# Patient Record
Sex: FEMALE | Race: WHITE | Employment: OTHER | URBAN - METROPOLITAN AREA
[De-identification: names, ages, dates, MRNs, and addresses within clinical notes are randomized per-mention and may not be internally consistent; named-entity substitution may affect disease eponyms.]

---

## 2017-01-16 ENCOUNTER — HOSPITAL ENCOUNTER (OUTPATIENT)
Dept: CV DIAGNOSTICS | Facility: HOSPITAL | Age: 72
Discharge: HOME | End: 2017-01-16
Attending: INTERNAL MEDICINE
Payer: MEDICARE

## 2017-01-16 DIAGNOSIS — R00.2 PALPITATIONS: ICD-10-CM

## 2017-01-16 PROCEDURE — 93226 XTRNL ECG REC<48 HR SCAN A/R: CPT

## 2017-01-16 PROCEDURE — 93227 XTRNL ECG REC<48 HR R&I: CPT | Performed by: INTERNAL MEDICINE

## 2017-01-16 PROCEDURE — 93225 XTRNL ECG REC<48 HRS REC: CPT

## 2017-01-19 ENCOUNTER — PRIOR ORIGINAL RECORDS (OUTPATIENT)
Dept: OTHER | Age: 72
End: 2017-01-19

## 2017-03-24 PROBLEM — G24.9 DYSTONIA: Status: ACTIVE | Noted: 2017-03-24

## 2017-04-03 ENCOUNTER — APPOINTMENT (OUTPATIENT)
Dept: LAB | Age: 72
End: 2017-04-03
Attending: PHYSICIAN ASSISTANT
Payer: MEDICARE

## 2017-04-03 DIAGNOSIS — B37.0 ORAL CANDIDA: ICD-10-CM

## 2017-04-03 DIAGNOSIS — K14.0 GLOSSITIS: ICD-10-CM

## 2017-04-03 PROCEDURE — 87070 CULTURE OTHR SPECIMN AEROBIC: CPT

## 2017-05-06 ENCOUNTER — HOSPITAL ENCOUNTER (OUTPATIENT)
Dept: ULTRASOUND IMAGING | Facility: HOSPITAL | Age: 72
Discharge: HOME | End: 2017-05-06
Attending: INTERNAL MEDICINE
Payer: MEDICARE

## 2017-05-06 DIAGNOSIS — R10.11 RUQ PAIN: ICD-10-CM

## 2017-05-06 PROCEDURE — 76700 US EXAM ABDOM COMPLETE: CPT | Performed by: INTERNAL MEDICINE

## 2017-05-23 ENCOUNTER — HOSPITAL (OUTPATIENT)
Dept: OTHER | Age: 72
End: 2017-05-23
Attending: INTERNAL MEDICINE

## 2017-06-14 ENCOUNTER — HOSPITAL (OUTPATIENT)
Dept: OTHER | Age: 72
End: 2017-06-14
Attending: INTERNAL MEDICINE

## 2017-06-14 ENCOUNTER — DIAGNOSTIC TRANS (OUTPATIENT)
Dept: OTHER | Age: 72
End: 2017-06-14

## 2017-06-16 ENCOUNTER — PRIOR ORIGINAL RECORDS (OUTPATIENT)
Dept: OTHER | Age: 72
End: 2017-06-16

## 2017-06-19 ENCOUNTER — PRIOR ORIGINAL RECORDS (OUTPATIENT)
Dept: OTHER | Age: 72
End: 2017-06-19

## 2017-06-27 ENCOUNTER — CHARTING TRANS (OUTPATIENT)
Dept: OTHER | Age: 72
End: 2017-06-27

## 2017-06-27 ENCOUNTER — HOSPITAL (OUTPATIENT)
Dept: OTHER | Age: 72
End: 2017-06-27
Attending: INTERNAL MEDICINE

## 2017-08-18 PROBLEM — R25.1 TREMOR: Status: ACTIVE | Noted: 2017-08-18

## 2017-08-18 PROCEDURE — 82746 ASSAY OF FOLIC ACID SERUM: CPT | Performed by: INTERNAL MEDICINE

## 2017-08-18 PROCEDURE — 82607 VITAMIN B-12: CPT | Performed by: INTERNAL MEDICINE

## 2017-10-26 ENCOUNTER — PRIOR ORIGINAL RECORDS (OUTPATIENT)
Dept: OTHER | Age: 72
End: 2017-10-26

## 2017-12-12 ENCOUNTER — PRIOR ORIGINAL RECORDS (OUTPATIENT)
Dept: OTHER | Age: 72
End: 2017-12-12

## 2018-01-02 ENCOUNTER — HOSPITAL ENCOUNTER (OUTPATIENT)
Dept: CV DIAGNOSTICS | Facility: HOSPITAL | Age: 73
Discharge: HOME | End: 2018-01-02
Attending: INTERNAL MEDICINE
Payer: MEDICARE

## 2018-01-02 DIAGNOSIS — R00.2 PALPITATION: ICD-10-CM

## 2018-01-02 PROCEDURE — 93226 XTRNL ECG REC<48 HR SCAN A/R: CPT | Performed by: INTERNAL MEDICINE

## 2018-01-02 PROCEDURE — 93225 XTRNL ECG REC<48 HRS REC: CPT | Performed by: INTERNAL MEDICINE

## 2018-01-02 PROCEDURE — 93227 XTRNL ECG REC<48 HR R&I: CPT | Performed by: INTERNAL MEDICINE

## 2018-01-22 LAB
ALBUMIN: 3.8 G/DL
ALKALINE PHOSPHATATE(ALK PHOS): 52 IU/L
BILIRUBIN TOTAL: 0.47 MG/DL
BUN: 11 MG/DL
CALCIUM: 9.2 MG/DL
CHLORIDE: 99 MEQ/L
CHOLESTEROL, TOTAL: 223 MG/DL
CREATININE, SERUM: 0.86 MG/DL
FREE T4: 1.26 MG/DL
GLUCOSE: 94 MG/DL
HDL CHOLESTEROL: 116 MG/DL
HEMATOCRIT: 40.7 %
HEMOGLOBIN A1C: 5.4 %
HEMOGLOBIN: 13 G/DL
LDL CHOLESTEROL: 93 MG/DL
PLATELETS: 174 K/UL
POTASSIUM, SERUM: 4.1 MEQ/L
PROTEIN, TOTAL: 7 G/DL
RED BLOOD COUNT: 4.37 X 10-6/U
SGOT (AST): 25 IU/L
SGPT (ALT): 32 IU/L
SODIUM: 136 MEQ/L
THYROID STIMULATING HORMONE: 1.51 MLU/L
TRIGLYCERIDES: 71 MG/DL
WHITE BLOOD COUNT: 3.68 X 10-3/U

## 2018-01-23 ENCOUNTER — PRIOR ORIGINAL RECORDS (OUTPATIENT)
Dept: OTHER | Age: 73
End: 2018-01-23

## 2018-04-02 ENCOUNTER — PRIOR ORIGINAL RECORDS (OUTPATIENT)
Dept: OTHER | Age: 73
End: 2018-04-02

## 2018-04-03 ENCOUNTER — PRIOR ORIGINAL RECORDS (OUTPATIENT)
Dept: OTHER | Age: 73
End: 2018-04-03

## 2018-04-12 ENCOUNTER — MYAURORA ACCOUNT LINK (OUTPATIENT)
Dept: OTHER | Age: 73
End: 2018-04-12

## 2018-04-12 ENCOUNTER — HOSPITAL ENCOUNTER (OUTPATIENT)
Dept: ULTRASOUND IMAGING | Facility: HOSPITAL | Age: 73
Discharge: HOME | End: 2018-04-12
Attending: INTERNAL MEDICINE
Payer: MEDICARE

## 2018-04-12 ENCOUNTER — HOSPITAL ENCOUNTER (OUTPATIENT)
Dept: CARDIOLOGY CLINIC | Facility: HOSPITAL | Age: 73
Discharge: HOME | End: 2018-04-12
Attending: INTERNAL MEDICINE

## 2018-04-12 ENCOUNTER — HOSPITAL ENCOUNTER (OUTPATIENT)
Dept: CV DIAGNOSTICS | Facility: HOSPITAL | Age: 73
Discharge: HOME | End: 2018-04-12
Attending: INTERNAL MEDICINE

## 2018-04-12 DIAGNOSIS — R10.9 ABDOMINAL PAIN: ICD-10-CM

## 2018-04-12 DIAGNOSIS — R94.30 ABNORMAL RESULTS OF CARDIOVASCULAR FUNCTION STUDIES: ICD-10-CM

## 2018-04-12 DIAGNOSIS — R00.2 PALPITATIONS: ICD-10-CM

## 2018-04-12 DIAGNOSIS — R94.30 ABNORMAL RESULT OF CARDIOVASCULAR FUNCTION STUDY: ICD-10-CM

## 2018-04-12 PROCEDURE — 76700 US EXAM ABDOM COMPLETE: CPT | Performed by: INTERNAL MEDICINE

## 2018-04-12 NOTE — IMAGING NOTE
Pt to have CTA gated coronary study on 4/17 @ 1000. She called in to discuss procedure and ask some questions. I provided explanation of the procedure, the medications uses, beta blocker and nitroglycerine and reasons why.  Pt very apprehensive about taking

## 2018-04-13 ENCOUNTER — LAB ENCOUNTER (OUTPATIENT)
Dept: LAB | Age: 73
End: 2018-04-13
Attending: INTERNAL MEDICINE
Payer: MEDICARE

## 2018-04-13 DIAGNOSIS — R94.30 NONSPECIFIC ABNORMAL FUNCTION STUDY, CARDIOVASCULAR: Primary | ICD-10-CM

## 2018-04-13 PROCEDURE — 82565 ASSAY OF CREATININE: CPT

## 2018-04-13 PROCEDURE — 84520 ASSAY OF UREA NITROGEN: CPT

## 2018-04-17 ENCOUNTER — HOSPITAL ENCOUNTER (OUTPATIENT)
Dept: CT IMAGING | Facility: HOSPITAL | Age: 73
Discharge: HOME | End: 2018-04-17
Attending: INTERNAL MEDICINE
Payer: MEDICARE

## 2018-04-17 ENCOUNTER — PRIOR ORIGINAL RECORDS (OUTPATIENT)
Dept: OTHER | Age: 73
End: 2018-04-17

## 2018-07-24 PROBLEM — E87.1 HYPONATREMIA: Status: ACTIVE | Noted: 2018-07-24

## 2018-07-24 PROBLEM — L65.9 HAIR LOSS: Status: ACTIVE | Noted: 2018-07-24

## 2018-07-24 PROBLEM — F10.10 ALCOHOL ABUSE: Status: ACTIVE | Noted: 2018-07-24

## 2018-07-24 PROBLEM — R20.2 PARESTHESIA: Status: ACTIVE | Noted: 2018-07-24

## 2018-07-30 ENCOUNTER — PRIOR ORIGINAL RECORDS (OUTPATIENT)
Dept: OTHER | Age: 73
End: 2018-07-30

## 2018-07-30 ENCOUNTER — MYAURORA ACCOUNT LINK (OUTPATIENT)
Dept: OTHER | Age: 73
End: 2018-07-30

## 2018-07-30 PROBLEM — E87.1 HYPONATREMIA: Status: RESOLVED | Noted: 2018-07-24 | Resolved: 2018-07-30

## 2018-08-01 ENCOUNTER — PRIOR ORIGINAL RECORDS (OUTPATIENT)
Dept: OTHER | Age: 73
End: 2018-08-01

## 2018-08-24 ENCOUNTER — PRIOR ORIGINAL RECORDS (OUTPATIENT)
Dept: OTHER | Age: 73
End: 2018-08-24

## 2018-08-28 ENCOUNTER — PRIOR ORIGINAL RECORDS (OUTPATIENT)
Dept: OTHER | Age: 73
End: 2018-08-28

## 2018-08-30 ENCOUNTER — PRIOR ORIGINAL RECORDS (OUTPATIENT)
Dept: OTHER | Age: 73
End: 2018-08-30

## 2018-09-25 ENCOUNTER — PRIOR ORIGINAL RECORDS (OUTPATIENT)
Dept: OTHER | Age: 73
End: 2018-09-25

## 2018-10-05 PROCEDURE — 87493 C DIFF AMPLIFIED PROBE: CPT | Performed by: INTERNAL MEDICINE

## 2018-10-06 PROCEDURE — 87077 CULTURE AEROBIC IDENTIFY: CPT | Performed by: INTERNAL MEDICINE

## 2018-10-06 PROCEDURE — 82272 OCCULT BLD FECES 1-3 TESTS: CPT | Performed by: INTERNAL MEDICINE

## 2018-10-06 PROCEDURE — 87045 FECES CULTURE AEROBIC BACT: CPT | Performed by: INTERNAL MEDICINE

## 2018-10-06 PROCEDURE — 87046 STOOL CULTR AEROBIC BACT EA: CPT | Performed by: INTERNAL MEDICINE

## 2018-10-06 PROCEDURE — 87427 SHIGA-LIKE TOXIN AG IA: CPT | Performed by: INTERNAL MEDICINE

## 2018-10-16 PROCEDURE — 87046 STOOL CULTR AEROBIC BACT EA: CPT | Performed by: INTERNAL MEDICINE

## 2018-10-16 PROCEDURE — 87045 FECES CULTURE AEROBIC BACT: CPT | Performed by: INTERNAL MEDICINE

## 2018-10-16 PROCEDURE — 87427 SHIGA-LIKE TOXIN AG IA: CPT | Performed by: INTERNAL MEDICINE

## 2018-10-19 ENCOUNTER — HOSPITAL ENCOUNTER (OUTPATIENT)
Dept: GENERAL RADIOLOGY | Age: 73
Discharge: HOME | End: 2018-10-19
Attending: INTERNAL MEDICINE
Payer: MEDICARE

## 2018-10-19 DIAGNOSIS — R68.83 CHILLS: ICD-10-CM

## 2018-10-19 DIAGNOSIS — R06.2 WHEEZING: ICD-10-CM

## 2018-10-19 PROCEDURE — 87040 BLOOD CULTURE FOR BACTERIA: CPT | Performed by: INTERNAL MEDICINE

## 2018-10-19 PROCEDURE — 71046 X-RAY EXAM CHEST 2 VIEWS: CPT | Performed by: INTERNAL MEDICINE

## 2018-10-29 PROBLEM — R68.83 CHILLS: Status: ACTIVE | Noted: 2018-10-29

## 2018-10-29 PROBLEM — G89.29 CHRONIC MIDLINE LOW BACK PAIN WITHOUT SCIATICA: Status: ACTIVE | Noted: 2018-10-29

## 2018-10-29 PROBLEM — M54.50 CHRONIC MIDLINE LOW BACK PAIN WITHOUT SCIATICA: Status: ACTIVE | Noted: 2018-10-29

## 2018-10-29 PROBLEM — R10.30 LOWER ABDOMINAL PAIN: Status: ACTIVE | Noted: 2018-10-29

## 2018-10-30 ENCOUNTER — HOSPITAL ENCOUNTER (OUTPATIENT)
Dept: GENERAL RADIOLOGY | Age: 73
Discharge: HOME | End: 2018-10-30
Attending: INTERNAL MEDICINE
Payer: MEDICARE

## 2018-10-30 DIAGNOSIS — R10.30 LOWER ABDOMINAL PAIN: ICD-10-CM

## 2018-10-30 PROCEDURE — 74018 RADEX ABDOMEN 1 VIEW: CPT | Performed by: INTERNAL MEDICINE

## 2018-12-10 PROCEDURE — 83516 IMMUNOASSAY NONANTIBODY: CPT | Performed by: INTERNAL MEDICINE

## 2018-12-12 PROCEDURE — 87338 HPYLORI STOOL AG IA: CPT | Performed by: INTERNAL MEDICINE

## 2018-12-20 ENCOUNTER — PRIOR ORIGINAL RECORDS (OUTPATIENT)
Dept: OTHER | Age: 73
End: 2018-12-20

## 2018-12-27 NOTE — IMAGING NOTE
Spoke to patient over phone and she complained about her vertigo which she has been getting more frequently. She said she will see her PCP Dec 31 at 1000 to hopefully get it resolved.  She said she will postpone  CTA appointment instead and move it on anot

## 2018-12-31 ENCOUNTER — HOSPITAL ENCOUNTER (OUTPATIENT)
Dept: CT IMAGING | Facility: HOSPITAL | Age: 73
Discharge: HOME | End: 2018-12-31
Attending: INTERNAL MEDICINE
Payer: MEDICARE

## 2019-01-15 ENCOUNTER — PRIOR ORIGINAL RECORDS (OUTPATIENT)
Dept: OTHER | Age: 74
End: 2019-01-15

## 2019-01-17 ENCOUNTER — PRIOR ORIGINAL RECORDS (OUTPATIENT)
Dept: OTHER | Age: 74
End: 2019-01-17

## 2019-01-22 ENCOUNTER — PRIOR ORIGINAL RECORDS (OUTPATIENT)
Dept: OTHER | Age: 74
End: 2019-01-22

## 2019-02-28 VITALS
WEIGHT: 115 LBS | DIASTOLIC BLOOD PRESSURE: 78 MMHG | HEIGHT: 62 IN | SYSTOLIC BLOOD PRESSURE: 130 MMHG | HEART RATE: 71 BPM | BODY MASS INDEX: 21.16 KG/M2

## 2019-02-28 VITALS — WEIGHT: 117 LBS | HEART RATE: 72 BPM | SYSTOLIC BLOOD PRESSURE: 120 MMHG | DIASTOLIC BLOOD PRESSURE: 70 MMHG

## 2019-03-07 ENCOUNTER — TELEPHONE (OUTPATIENT)
Dept: CARDIOLOGY | Age: 74
End: 2019-03-07

## 2019-04-08 ENCOUNTER — TELEPHONE (OUTPATIENT)
Dept: CARDIOLOGY | Age: 74
End: 2019-04-08

## 2019-04-12 ENCOUNTER — TELEPHONE (OUTPATIENT)
Dept: CARDIOLOGY | Age: 74
End: 2019-04-12

## 2019-04-27 RX ORDER — RANITIDINE 150 MG/1
TABLET ORAL
COMMUNITY
End: 2020-06-29

## 2019-04-27 RX ORDER — PANTOPRAZOLE SODIUM 40 MG/1
TABLET, DELAYED RELEASE ORAL
COMMUNITY
End: 2020-06-25

## 2019-04-27 RX ORDER — SUCRALFATE 1 G/1
TABLET ORAL
COMMUNITY
End: 2020-06-25

## 2019-04-27 RX ORDER — METOPROLOL TARTRATE 50 MG/1
TABLET, FILM COATED ORAL
COMMUNITY
End: 2020-06-25

## 2019-04-27 RX ORDER — GABAPENTIN 100 MG/1
CAPSULE ORAL
COMMUNITY
End: 2020-06-25 | Stop reason: DRUGHIGH

## 2019-04-27 RX ORDER — DIAZEPAM 2 MG/1
TABLET ORAL
COMMUNITY

## 2019-05-01 PROBLEM — R53.83 FATIGUE, UNSPECIFIED TYPE: Status: ACTIVE | Noted: 2019-05-01

## 2019-05-01 PROBLEM — R41.3 MEMORY LOSS: Status: ACTIVE | Noted: 2019-05-01

## 2019-05-01 PROBLEM — M54.50 CHRONIC BILATERAL LOW BACK PAIN WITHOUT SCIATICA: Status: ACTIVE | Noted: 2018-10-29

## 2019-05-01 PROBLEM — G89.29 CHRONIC BILATERAL LOW BACK PAIN WITHOUT SCIATICA: Status: ACTIVE | Noted: 2018-10-29

## 2019-05-01 PROBLEM — K59.00 CONSTIPATION, UNSPECIFIED CONSTIPATION TYPE: Status: ACTIVE | Noted: 2019-05-01

## 2019-05-03 PROCEDURE — 83921 ORGANIC ACID SINGLE QUANT: CPT | Performed by: INTERNAL MEDICINE

## 2019-05-03 PROCEDURE — 84425 ASSAY OF VITAMIN B-1: CPT | Performed by: INTERNAL MEDICINE

## 2019-05-06 ENCOUNTER — OFFICE VISIT (OUTPATIENT)
Dept: CARDIOLOGY | Age: 74
End: 2019-05-06

## 2019-05-06 VITALS
HEART RATE: 68 BPM | WEIGHT: 117 LBS | BODY MASS INDEX: 21.53 KG/M2 | SYSTOLIC BLOOD PRESSURE: 120 MMHG | DIASTOLIC BLOOD PRESSURE: 76 MMHG | HEIGHT: 62 IN

## 2019-05-06 DIAGNOSIS — I25.10 CORONARY ARTERY DISEASE INVOLVING NATIVE CORONARY ARTERY OF NATIVE HEART WITHOUT ANGINA PECTORIS: Primary | ICD-10-CM

## 2019-05-06 DIAGNOSIS — R93.1 ELEVATED CORONARY ARTERY CALCIUM SCORE: ICD-10-CM

## 2019-05-06 DIAGNOSIS — E78.00 PURE HYPERCHOLESTEROLEMIA: ICD-10-CM

## 2019-05-06 DIAGNOSIS — M48.02 CERVICAL STENOSIS OF SPINAL CANAL: ICD-10-CM

## 2019-05-06 DIAGNOSIS — R00.2 PALPITATIONS: ICD-10-CM

## 2019-05-06 PROCEDURE — 99215 OFFICE O/P EST HI 40 MIN: CPT | Performed by: INTERNAL MEDICINE

## 2019-05-06 ASSESSMENT — ENCOUNTER SYMPTOMS
SUSPICIOUS LESIONS: 0
CHILLS: 0
HEMOPTYSIS: 0
HEMATOCHEZIA: 0
BRUISES/BLEEDS EASILY: 0
FEVER: 0
ALLERGIC/IMMUNOLOGIC COMMENTS: NO NEW FOOD ALLERGIES
COUGH: 0
WEIGHT GAIN: 0
WEIGHT LOSS: 0

## 2019-11-21 PROBLEM — R10.30 LOWER ABDOMINAL PAIN: Status: RESOLVED | Noted: 2018-10-29 | Resolved: 2019-11-21

## 2019-11-21 PROBLEM — R68.83 CHILLS: Status: RESOLVED | Noted: 2018-10-29 | Resolved: 2019-11-21

## 2019-11-21 PROBLEM — R63.5 WEIGHT GAIN: Status: ACTIVE | Noted: 2019-11-21

## 2019-11-21 PROBLEM — K59.09 CHRONIC CONSTIPATION: Status: ACTIVE | Noted: 2019-05-01

## 2019-11-21 PROBLEM — M62.89 HIGH-TONE PELVIC FLOOR DYSFUNCTION: Status: ACTIVE | Noted: 2019-08-16

## 2019-11-21 PROBLEM — N94.89 HIGH-TONE PELVIC FLOOR DYSFUNCTION: Status: ACTIVE | Noted: 2019-08-16

## 2019-11-21 PROBLEM — E72.11 HYPERHOMOCYSTEINEMIA (HCC): Status: ACTIVE | Noted: 2019-11-21

## 2019-11-21 PROBLEM — R53.83 FATIGUE, UNSPECIFIED TYPE: Status: RESOLVED | Noted: 2019-05-01 | Resolved: 2019-11-21

## 2019-11-21 PROBLEM — M48.02 CERVICAL STENOSIS OF SPINAL CANAL: Status: ACTIVE | Noted: 2019-05-06

## 2019-11-21 PROBLEM — R93.1 ELEVATED CORONARY ARTERY CALCIUM SCORE: Status: ACTIVE | Noted: 2019-11-21

## 2019-12-10 PROBLEM — F07.81 POST CONCUSSIVE SYNDROME: Status: ACTIVE | Noted: 2019-12-10

## 2020-01-01 ENCOUNTER — EXTERNAL RECORD (OUTPATIENT)
Dept: OTHER | Age: 75
End: 2020-01-01

## 2020-01-16 PROBLEM — E87.1 HYPONATREMIA: Status: RESOLVED | Noted: 2018-07-24 | Resolved: 2020-01-16

## 2020-04-23 ENCOUNTER — TELEPHONE (OUTPATIENT)
Dept: CARDIOLOGY | Age: 75
End: 2020-04-23

## 2020-05-13 ENCOUNTER — TELEPHONE (OUTPATIENT)
Dept: CARDIOLOGY | Age: 75
End: 2020-05-13

## 2020-06-17 ENCOUNTER — TELEPHONE (OUTPATIENT)
Dept: CARDIOLOGY | Age: 75
End: 2020-06-17

## 2020-06-23 ENCOUNTER — ORDER TRANSCRIPTION (OUTPATIENT)
Dept: ADMINISTRATIVE | Facility: HOSPITAL | Age: 75
End: 2020-06-23

## 2020-06-23 ENCOUNTER — TELEPHONE (OUTPATIENT)
Dept: CARDIOLOGY | Age: 75
End: 2020-06-23

## 2020-06-23 DIAGNOSIS — E78.00 PURE HYPERCHOLESTEROLEMIA: ICD-10-CM

## 2020-06-23 DIAGNOSIS — R93.1 ELEVATED CORONARY ARTERY CALCIUM SCORE: ICD-10-CM

## 2020-06-23 DIAGNOSIS — R00.2 PALPITATIONS: ICD-10-CM

## 2020-06-23 DIAGNOSIS — I25.10 CORONARY ATHEROSCLEROSIS OF NATIVE CORONARY ARTERY: ICD-10-CM

## 2020-06-23 DIAGNOSIS — E78.00 PURE HYPERCHOLESTEROLEMIA: Primary | ICD-10-CM

## 2020-06-23 DIAGNOSIS — I25.10 CORONARY ARTERY DISEASE INVOLVING NATIVE CORONARY ARTERY OF NATIVE HEART WITHOUT ANGINA PECTORIS: Primary | ICD-10-CM

## 2020-06-25 ENCOUNTER — OFFICE VISIT (OUTPATIENT)
Dept: CARDIOLOGY | Age: 75
End: 2020-06-25

## 2020-06-25 VITALS
DIASTOLIC BLOOD PRESSURE: 68 MMHG | HEART RATE: 80 BPM | BODY MASS INDEX: 22.26 KG/M2 | SYSTOLIC BLOOD PRESSURE: 114 MMHG | WEIGHT: 121 LBS | HEIGHT: 62 IN

## 2020-06-25 DIAGNOSIS — R93.1 ELEVATED CORONARY ARTERY CALCIUM SCORE: ICD-10-CM

## 2020-06-25 DIAGNOSIS — I25.10 CORONARY ARTERY DISEASE INVOLVING NATIVE CORONARY ARTERY OF NATIVE HEART WITHOUT ANGINA PECTORIS: Primary | ICD-10-CM

## 2020-06-25 DIAGNOSIS — E78.00 PURE HYPERCHOLESTEROLEMIA: ICD-10-CM

## 2020-06-25 DIAGNOSIS — M79.7 FIBROMYALGIA: ICD-10-CM

## 2020-06-25 PROCEDURE — 99213 OFFICE O/P EST LOW 20 MIN: CPT | Performed by: NURSE PRACTITIONER

## 2020-06-25 RX ORDER — MELATONIN: COMMUNITY

## 2020-06-25 RX ORDER — GABAPENTIN 100 MG/1
100 CAPSULE ORAL 2 TIMES DAILY
COMMUNITY

## 2020-06-25 RX ORDER — RABEPRAZOLE SODIUM 20 MG/1
20 TABLET, DELAYED RELEASE ORAL 2 TIMES DAILY
COMMUNITY
End: 2020-06-29

## 2020-06-25 ASSESSMENT — PATIENT HEALTH QUESTIONNAIRE - PHQ9
SUM OF ALL RESPONSES TO PHQ9 QUESTIONS 1 AND 2: 0
CLINICAL INTERPRETATION OF PHQ9 SCORE: NO FURTHER SCREENING NEEDED
SUM OF ALL RESPONSES TO PHQ9 QUESTIONS 1 AND 2: 0
CLINICAL INTERPRETATION OF PHQ2 SCORE: NO FURTHER SCREENING NEEDED
1. LITTLE INTEREST OR PLEASURE IN DOING THINGS: NOT AT ALL
2. FEELING DOWN, DEPRESSED OR HOPELESS: NOT AT ALL

## 2020-06-25 ASSESSMENT — ENCOUNTER SYMPTOMS
WEIGHT GAIN: 0
GASTROINTESTINAL NEGATIVE: 1
SYNCOPE: 0
DECREASED APPETITE: 0
NEUROLOGICAL NEGATIVE: 1
DIAPHORESIS: 0
WEIGHT LOSS: 0
SHORTNESS OF BREATH: 0

## 2020-06-29 ENCOUNTER — TELEPHONE (OUTPATIENT)
Dept: CARDIOLOGY | Age: 75
End: 2020-06-29

## 2020-06-29 ENCOUNTER — OFFICE VISIT (OUTPATIENT)
Dept: CARDIOLOGY | Age: 75
End: 2020-06-29

## 2020-06-29 VITALS — BODY MASS INDEX: 21.53 KG/M2 | WEIGHT: 117 LBS | HEIGHT: 62 IN

## 2020-06-29 DIAGNOSIS — E78.00 PURE HYPERCHOLESTEROLEMIA: ICD-10-CM

## 2020-06-29 DIAGNOSIS — I25.10 CORONARY ARTERY DISEASE INVOLVING NATIVE CORONARY ARTERY OF NATIVE HEART WITHOUT ANGINA PECTORIS: ICD-10-CM

## 2020-06-29 DIAGNOSIS — R93.1 ELEVATED CORONARY ARTERY CALCIUM SCORE: ICD-10-CM

## 2020-06-29 DIAGNOSIS — G25.0 ESSENTIAL TREMOR: Primary | ICD-10-CM

## 2020-06-29 PROBLEM — F43.10 POST TRAUMATIC STRESS DISORDER (PTSD): Status: ACTIVE | Noted: 2020-06-29

## 2020-06-29 RX ORDER — PANTOPRAZOLE SODIUM 40 MG/1
40 TABLET, DELAYED RELEASE ORAL DAILY
COMMUNITY

## 2020-06-29 SDOH — HEALTH STABILITY: MENTAL HEALTH: HOW MANY STANDARD DRINKS CONTAINING ALCOHOL DO YOU HAVE ON A TYPICAL DAY?: 1 OR 2

## 2020-06-29 SDOH — HEALTH STABILITY: PHYSICAL HEALTH: ON AVERAGE, HOW MANY MINUTES DO YOU ENGAGE IN EXERCISE AT THIS LEVEL?: 150+ MIN

## 2020-06-29 SDOH — HEALTH STABILITY: MENTAL HEALTH: HOW OFTEN DO YOU HAVE A DRINK CONTAINING ALCOHOL?: 4 OR MORE TIMES A WEEK

## 2020-06-29 SDOH — HEALTH STABILITY: PHYSICAL HEALTH: ON AVERAGE, HOW MANY DAYS PER WEEK DO YOU ENGAGE IN MODERATE TO STRENUOUS EXERCISE (LIKE A BRISK WALK)?: 5 DAYS

## 2020-06-29 ASSESSMENT — ENCOUNTER SYMPTOMS
ALLERGIC/IMMUNOLOGIC COMMENTS: NO NEW FOOD ALLERGIES
SUSPICIOUS LESIONS: 0
HEMOPTYSIS: 0
WEIGHT LOSS: 0
WEIGHT GAIN: 0
FEVER: 0
BRUISES/BLEEDS EASILY: 0
CHILLS: 0
HEMATOCHEZIA: 0
COUGH: 0

## 2020-06-30 ENCOUNTER — OFFICE VISIT (OUTPATIENT)
Dept: CARDIOLOGY | Age: 75
End: 2020-06-30

## 2020-06-30 VITALS
WEIGHT: 120 LBS | HEART RATE: 74 BPM | DIASTOLIC BLOOD PRESSURE: 70 MMHG | BODY MASS INDEX: 21.95 KG/M2 | SYSTOLIC BLOOD PRESSURE: 122 MMHG

## 2020-06-30 DIAGNOSIS — M48.02 CERVICAL STENOSIS OF SPINAL CANAL: ICD-10-CM

## 2020-06-30 DIAGNOSIS — R93.1 ELEVATED CORONARY ARTERY CALCIUM SCORE: Primary | ICD-10-CM

## 2020-06-30 DIAGNOSIS — R07.9 CHEST PAIN, UNSPECIFIED TYPE: ICD-10-CM

## 2020-06-30 DIAGNOSIS — I25.10 CORONARY ARTERY DISEASE INVOLVING NATIVE CORONARY ARTERY OF NATIVE HEART WITHOUT ANGINA PECTORIS: ICD-10-CM

## 2020-06-30 DIAGNOSIS — R06.09 DYSPNEA ON EXERTION: ICD-10-CM

## 2020-06-30 DIAGNOSIS — G25.0 ESSENTIAL TREMOR: ICD-10-CM

## 2020-06-30 PROCEDURE — 99215 OFFICE O/P EST HI 40 MIN: CPT | Performed by: INTERNAL MEDICINE

## 2020-06-30 SDOH — HEALTH STABILITY: MENTAL HEALTH: HOW OFTEN DO YOU HAVE A DRINK CONTAINING ALCOHOL?: 4 OR MORE TIMES A WEEK

## 2020-06-30 SDOH — HEALTH STABILITY: MENTAL HEALTH: HOW MANY STANDARD DRINKS CONTAINING ALCOHOL DO YOU HAVE ON A TYPICAL DAY?: 1 OR 2

## 2020-06-30 ASSESSMENT — PATIENT HEALTH QUESTIONNAIRE - PHQ9
CLINICAL INTERPRETATION OF PHQ9 SCORE: NO FURTHER SCREENING NEEDED
CLINICAL INTERPRETATION OF PHQ2 SCORE: NO FURTHER SCREENING NEEDED
2. FEELING DOWN, DEPRESSED OR HOPELESS: NOT AT ALL
SUM OF ALL RESPONSES TO PHQ9 QUESTIONS 1 AND 2: 0
SUM OF ALL RESPONSES TO PHQ9 QUESTIONS 1 AND 2: 0
1. LITTLE INTEREST OR PLEASURE IN DOING THINGS: NOT AT ALL

## 2020-06-30 ASSESSMENT — ENCOUNTER SYMPTOMS: SHORTNESS OF BREATH: 1

## 2020-08-29 PROBLEM — F43.10 POST TRAUMATIC STRESS DISORDER (PTSD): Status: ACTIVE | Noted: 2020-06-29

## 2020-08-29 PROBLEM — N30.90 CYSTITIS: Status: ACTIVE | Noted: 2020-08-29

## 2020-10-12 ENCOUNTER — TELEPHONE (OUTPATIENT)
Dept: CARDIOLOGY | Age: 75
End: 2020-10-12

## 2020-10-13 ENCOUNTER — TELEPHONE (OUTPATIENT)
Dept: CARDIOLOGY | Age: 75
End: 2020-10-13

## 2020-10-20 ENCOUNTER — OFFICE VISIT (OUTPATIENT)
Dept: CARDIOLOGY | Age: 75
End: 2020-10-20

## 2020-10-20 VITALS
DIASTOLIC BLOOD PRESSURE: 60 MMHG | WEIGHT: 120 LBS | HEART RATE: 70 BPM | BODY MASS INDEX: 21.95 KG/M2 | SYSTOLIC BLOOD PRESSURE: 120 MMHG

## 2020-10-20 DIAGNOSIS — I25.10 CORONARY ARTERY DISEASE INVOLVING NATIVE CORONARY ARTERY OF NATIVE HEART WITHOUT ANGINA PECTORIS: ICD-10-CM

## 2020-10-20 DIAGNOSIS — E78.00 PURE HYPERCHOLESTEROLEMIA: Primary | ICD-10-CM

## 2020-10-20 DIAGNOSIS — R93.1 ELEVATED CORONARY ARTERY CALCIUM SCORE: ICD-10-CM

## 2020-10-20 DIAGNOSIS — G25.0 ESSENTIAL TREMOR: ICD-10-CM

## 2020-10-20 PROCEDURE — 99213 OFFICE O/P EST LOW 20 MIN: CPT | Performed by: INTERNAL MEDICINE

## 2020-10-20 SDOH — HEALTH STABILITY: MENTAL HEALTH: HOW OFTEN DO YOU HAVE A DRINK CONTAINING ALCOHOL?: 4 OR MORE TIMES A WEEK

## 2020-10-20 SDOH — HEALTH STABILITY: MENTAL HEALTH: HOW MANY STANDARD DRINKS CONTAINING ALCOHOL DO YOU HAVE ON A TYPICAL DAY?: 1 OR 2

## 2020-10-20 ASSESSMENT — PATIENT HEALTH QUESTIONNAIRE - PHQ9
1. LITTLE INTEREST OR PLEASURE IN DOING THINGS: NOT AT ALL
SUM OF ALL RESPONSES TO PHQ9 QUESTIONS 1 AND 2: 0
SUM OF ALL RESPONSES TO PHQ9 QUESTIONS 1 AND 2: 0
2. FEELING DOWN, DEPRESSED OR HOPELESS: NOT AT ALL
CLINICAL INTERPRETATION OF PHQ9 SCORE: NO FURTHER SCREENING NEEDED
CLINICAL INTERPRETATION OF PHQ2 SCORE: NO FURTHER SCREENING NEEDED

## 2020-10-20 ASSESSMENT — ENCOUNTER SYMPTOMS
WEIGHT LOSS: 0
WEIGHT GAIN: 0
SUSPICIOUS LESIONS: 0
COUGH: 0
FEVER: 0
HEMATOCHEZIA: 0
HEMOPTYSIS: 0
BRUISES/BLEEDS EASILY: 0
ALLERGIC/IMMUNOLOGIC COMMENTS: NO NEW FOOD ALLERGIES
CHILLS: 0

## 2020-10-21 ENCOUNTER — TELEPHONE (OUTPATIENT)
Dept: CARDIOLOGY | Age: 75
End: 2020-10-21

## 2020-10-21 DIAGNOSIS — R93.1 ELEVATED CORONARY ARTERY CALCIUM SCORE: Primary | ICD-10-CM

## 2020-10-21 DIAGNOSIS — E78.00 PURE HYPERCHOLESTEROLEMIA: ICD-10-CM

## 2020-10-21 DIAGNOSIS — I25.10 CORONARY ARTERY DISEASE INVOLVING NATIVE CORONARY ARTERY OF NATIVE HEART WITHOUT ANGINA PECTORIS: ICD-10-CM

## 2020-10-26 ENCOUNTER — TELEPHONE (OUTPATIENT)
Dept: CARDIOLOGY | Age: 75
End: 2020-10-26

## 2020-10-27 ENCOUNTER — TELEPHONE (OUTPATIENT)
Dept: CARDIOLOGY | Age: 75
End: 2020-10-27

## 2020-10-27 DIAGNOSIS — E78.00 PURE HYPERCHOLESTEROLEMIA: ICD-10-CM

## 2020-10-27 DIAGNOSIS — R93.1 ELEVATED CORONARY ARTERY CALCIUM SCORE: Primary | ICD-10-CM

## 2020-10-27 DIAGNOSIS — I25.10 CORONARY ARTERY DISEASE INVOLVING NATIVE CORONARY ARTERY OF NATIVE HEART WITHOUT ANGINA PECTORIS: ICD-10-CM

## 2020-11-02 ENCOUNTER — LAB ENCOUNTER (OUTPATIENT)
Dept: LAB | Facility: HOSPITAL | Age: 75
End: 2020-11-02
Attending: INTERNAL MEDICINE
Payer: MEDICARE

## 2020-11-02 ENCOUNTER — HOSPITAL ENCOUNTER (OUTPATIENT)
Dept: GENERAL RADIOLOGY | Facility: HOSPITAL | Age: 75
Discharge: HOME | End: 2020-11-02
Attending: INTERNAL MEDICINE
Payer: MEDICARE

## 2020-11-02 DIAGNOSIS — I25.10 CAD (CORONARY ARTERY DISEASE): ICD-10-CM

## 2020-11-02 DIAGNOSIS — R93.1 ELEVATED CORONARY ARTERY CALCIUM SCORE: ICD-10-CM

## 2020-11-02 DIAGNOSIS — I25.10 CORONARY ARTERY DISEASE INVOLVING NATIVE CORONARY ARTERY OF NATIVE HEART WITHOUT ANGINA PECTORIS: ICD-10-CM

## 2020-11-02 DIAGNOSIS — E78.00 PURE HYPERCHOLESTEROLEMIA: ICD-10-CM

## 2020-11-02 PROCEDURE — 71046 X-RAY EXAM CHEST 2 VIEWS: CPT | Performed by: INTERNAL MEDICINE

## 2020-11-02 PROCEDURE — 93010 ELECTROCARDIOGRAM REPORT: CPT | Performed by: INTERNAL MEDICINE

## 2020-11-02 PROCEDURE — 93005 ELECTROCARDIOGRAM TRACING: CPT

## 2020-11-02 PROCEDURE — 85027 COMPLETE CBC AUTOMATED: CPT

## 2020-11-02 PROCEDURE — 80048 BASIC METABOLIC PNL TOTAL CA: CPT

## 2020-11-02 PROCEDURE — 36415 COLL VENOUS BLD VENIPUNCTURE: CPT

## 2020-11-09 ENCOUNTER — TELEPHONE (OUTPATIENT)
Dept: CARDIOLOGY | Age: 75
End: 2020-11-09

## 2020-11-09 ENCOUNTER — APPOINTMENT (OUTPATIENT)
Dept: LAB | Facility: HOSPITAL | Age: 75
End: 2020-11-09
Attending: INTERNAL MEDICINE
Payer: MEDICARE

## 2020-11-09 DIAGNOSIS — I25.10 CAD (CORONARY ARTERY DISEASE): ICD-10-CM

## 2020-11-10 LAB
SARS-COV-2 RNA SPEC QL NAA+PROBE: NOT DETECTED
SPECIMEN SOURCE: NORMAL

## 2020-11-10 NOTE — HISTORICAL OFFICE NOTE
Progress Notes  - documented in this encounter  Antoinette Doherty MD - 06/30/2020 12:00 PM CDT  Formatting of this note might be different from the original.    [address]    PCP: Kranthi Bacon MD    Chief Complaint   Patient presents with   • Other (See Comments)   Injection    • Duloxetine Other (See Comments)   Oral    • Epinephrine Other (See Comments)   Injection    • Lexapro Other (See Comments)   Oral    • Penicillins Other (See Comments)   CLASS      Presenting Medications  Current Medic has had a CTA of her losartan. If not we will have to undergo angiography given her ultrafast score of 591. Patient has essential tremor with dystonia. Patient also has cervical spinal count has had lumbar sacral surgery.  Her palpitations been reasonabl

## 2020-11-11 ENCOUNTER — TELEPHONE (OUTPATIENT)
Dept: CARDIOLOGY | Age: 75
End: 2020-11-11

## 2020-11-11 ENCOUNTER — HOSPITAL ENCOUNTER (OUTPATIENT)
Dept: INTERVENTIONAL RADIOLOGY/VASCULAR | Facility: HOSPITAL | Age: 75
Discharge: HOME | End: 2020-11-11
Attending: INTERNAL MEDICINE | Admitting: INTERNAL MEDICINE
Payer: MEDICARE

## 2020-11-11 VITALS
BODY MASS INDEX: 21.71 KG/M2 | HEART RATE: 63 BPM | TEMPERATURE: 98 F | RESPIRATION RATE: 17 BRPM | HEIGHT: 62 IN | WEIGHT: 118 LBS | SYSTOLIC BLOOD PRESSURE: 149 MMHG | OXYGEN SATURATION: 98 % | DIASTOLIC BLOOD PRESSURE: 73 MMHG

## 2020-11-11 DIAGNOSIS — I25.10 CAD (CORONARY ARTERY DISEASE): Primary | ICD-10-CM

## 2020-11-11 DIAGNOSIS — R93.1 ELEVATED CORONARY ARTERY CALCIUM SCORE: Primary | ICD-10-CM

## 2020-11-11 DIAGNOSIS — I25.10 CORONARY ARTERY DISEASE INVOLVING NATIVE CORONARY ARTERY OF NATIVE HEART WITHOUT ANGINA PECTORIS: ICD-10-CM

## 2020-11-11 PROCEDURE — 4A023N7 MEASUREMENT OF CARDIAC SAMPLING AND PRESSURE, LEFT HEART, PERCUTANEOUS APPROACH: ICD-10-PCS | Performed by: INTERNAL MEDICINE

## 2020-11-11 PROCEDURE — B2111ZZ FLUOROSCOPY OF MULTIPLE CORONARY ARTERIES USING LOW OSMOLAR CONTRAST: ICD-10-PCS | Performed by: INTERNAL MEDICINE

## 2020-11-11 PROCEDURE — 93458 L HRT ARTERY/VENTRICLE ANGIO: CPT

## 2020-11-11 PROCEDURE — 93458 L HRT ARTERY/VENTRICLE ANGIO: CPT | Performed by: INTERNAL MEDICINE

## 2020-11-11 PROCEDURE — B2151ZZ FLUOROSCOPY OF LEFT HEART USING LOW OSMOLAR CONTRAST: ICD-10-PCS | Performed by: INTERNAL MEDICINE

## 2020-11-11 PROCEDURE — 99152 MOD SED SAME PHYS/QHP 5/>YRS: CPT

## 2020-11-11 RX ORDER — ASPIRIN 81 MG/1
TABLET, CHEWABLE ORAL
Status: COMPLETED
Start: 2020-11-11 | End: 2020-11-11

## 2020-11-11 RX ORDER — SODIUM CHLORIDE 9 MG/ML
INJECTION, SOLUTION INTRAVENOUS
Status: DISCONTINUED | OUTPATIENT
Start: 2020-11-12 | End: 2020-11-11 | Stop reason: HOSPADM

## 2020-11-11 RX ORDER — ASPIRIN 81 MG/1
81 TABLET, CHEWABLE ORAL ONCE
COMMUNITY
End: 2021-08-11 | Stop reason: ALTCHOICE

## 2020-11-11 RX ORDER — LIDOCAINE HYDROCHLORIDE 10 MG/ML
INJECTION, SOLUTION EPIDURAL; INFILTRATION; INTRACAUDAL; PERINEURAL
Status: COMPLETED
Start: 2020-11-11 | End: 2020-11-11

## 2020-11-11 RX ORDER — HEPARIN SODIUM 5000 [USP'U]/ML
INJECTION, SOLUTION INTRAVENOUS; SUBCUTANEOUS
Status: COMPLETED
Start: 2020-11-11 | End: 2020-11-11

## 2020-11-11 RX ORDER — ASPIRIN 81 MG/1
162 TABLET, CHEWABLE ORAL DAILY
Status: DISCONTINUED | OUTPATIENT
Start: 2020-11-11 | End: 2020-11-11

## 2020-11-11 RX ORDER — MIDAZOLAM HYDROCHLORIDE 1 MG/ML
INJECTION INTRAMUSCULAR; INTRAVENOUS
Status: COMPLETED
Start: 2020-11-11 | End: 2020-11-11

## 2020-11-11 NOTE — H&P
History & Physical Examination    Patient Name: Gage Singh  MRN: [id number]  [facility]: [id number]  YOB: 1945    Diagnosis: chest pain and elevated calcium score on her ultrafast CT    Present Illness:     Patient has been having some Terconazole 0.4 % Vaginal Cream, Place 1 applicator vaginally as needed. , Disp: , Rfl: 0, More than a month at Unknown time      No current facility-administered medications for this encounter.        Allergies:   Demerol                 PALPITATIONS, OTH OTHER SURGICAL HISTORY      thyroidectomy left, partial on right   • THYROIDECTOMY  2001    Left lobe removed / partial right lobe   • THYROIDECTOMY POST PREV THYR SURG  2001   • UPPER GI ENDOSCOPY - REFERRAL  9/30/2013   • UPPER GI ENDOSCOPY,BIOPSY  07/03

## 2020-11-11 NOTE — PROCEDURES
[address]    PATIENT'S NAME: Everton Henson   ATTENDING PHYSICIAN: Bharti White M.D. OPERATING PHYSICIAN: Bharti White M.D.    PATIENT ACCOUNT#:   [de-identified]    LOCATION:  [place] [address] ED  MEDICAL RECORD #:   [id number]

## 2020-11-11 NOTE — PLAN OF CARE
Patient ambulated in [place] without any difficulty. Right  groin site remains soft, no hematoma, dressing C/D/I. Tolerating PO intake. Discharge orders received on patient. Discharge instructions/education reviewed with patient and family.  Patient and family

## 2020-11-11 NOTE — PLAN OF CARE
Patient received from cath lab s/p [facility]. Right groin site soft, no hematoma, dressing C/D/I. Pulses intact. Hemodynamics stable. Post-op orders received and implemented. Patient and family educated on flat bedrest, verbalize understanding. Concerns and qu

## 2020-11-12 ENCOUNTER — TELEPHONE (OUTPATIENT)
Dept: CARDIOLOGY | Age: 75
End: 2020-11-12

## 2020-11-12 DIAGNOSIS — I25.10 CORONARY ARTERY DISEASE INVOLVING NATIVE CORONARY ARTERY OF NATIVE HEART WITHOUT ANGINA PECTORIS: Primary | ICD-10-CM

## 2020-11-12 RX ORDER — CLOPIDOGREL BISULFATE 75 MG/1
75 TABLET ORAL DAILY
Qty: 90 TABLET | Refills: 3 | Status: SHIPPED | OUTPATIENT
Start: 2020-11-12 | End: 2021-02-01 | Stop reason: ALTCHOICE

## 2020-11-16 ENCOUNTER — TELEPHONE (OUTPATIENT)
Dept: CARDIOLOGY | Age: 75
End: 2020-11-16

## 2020-11-17 ENCOUNTER — TELEPHONE (OUTPATIENT)
Dept: CARDIOLOGY | Age: 75
End: 2020-11-17

## 2020-11-17 RX ORDER — CLOPIDOGREL BISULFATE 75 MG/1
75 TABLET ORAL DAILY
COMMUNITY
Start: 2020-11-18 | End: 2021-02-18

## 2020-11-18 ENCOUNTER — HOSPITAL ENCOUNTER (OUTPATIENT)
Dept: INTERVENTIONAL RADIOLOGY/VASCULAR | Facility: HOSPITAL | Age: 75
Discharge: HOME | End: 2020-11-19
Attending: INTERNAL MEDICINE | Admitting: INTERNAL MEDICINE
Payer: MEDICARE

## 2020-11-18 DIAGNOSIS — I25.10 CAD (CORONARY ARTERY DISEASE): ICD-10-CM

## 2020-11-18 PROCEDURE — 02C03ZZ EXTIRPATION OF MATTER FROM CORONARY ARTERY, ONE ARTERY, PERCUTANEOUS APPROACH: ICD-10-PCS | Performed by: INTERNAL MEDICINE

## 2020-11-18 PROCEDURE — 85347 COAGULATION TIME ACTIVATED: CPT

## 2020-11-18 PROCEDURE — 99152 MOD SED SAME PHYS/QHP 5/>YRS: CPT

## 2020-11-18 PROCEDURE — 93010 ELECTROCARDIOGRAM REPORT: CPT | Performed by: INTERNAL MEDICINE

## 2020-11-18 PROCEDURE — 99153 MOD SED SAME PHYS/QHP EA: CPT

## 2020-11-18 PROCEDURE — 93005 ELECTROCARDIOGRAM TRACING: CPT

## 2020-11-18 PROCEDURE — 92933 PRQ TRLML C ATHRC ST ANGIOP1: CPT | Performed by: INTERNAL MEDICINE

## 2020-11-18 PROCEDURE — 027034Z DILATION OF CORONARY ARTERY, ONE ARTERY WITH DRUG-ELUTING INTRALUMINAL DEVICE, PERCUTANEOUS APPROACH: ICD-10-PCS | Performed by: INTERNAL MEDICINE

## 2020-11-18 RX ORDER — MELATONIN
3 NIGHTLY
Status: COMPLETED | OUTPATIENT
Start: 2020-11-18 | End: 2020-11-18

## 2020-11-18 RX ORDER — SODIUM CHLORIDE 9 MG/ML
INJECTION, SOLUTION INTRAVENOUS CONTINUOUS
Status: ACTIVE | OUTPATIENT
Start: 2020-11-18 | End: 2020-11-18

## 2020-11-18 RX ORDER — CLOPIDOGREL BISULFATE 75 MG/1
75 TABLET ORAL DAILY
Status: DISCONTINUED | OUTPATIENT
Start: 2020-11-19 | End: 2020-11-19

## 2020-11-18 RX ORDER — HEPARIN SODIUM 5000 [USP'U]/ML
INJECTION, SOLUTION INTRAVENOUS; SUBCUTANEOUS
Status: COMPLETED
Start: 2020-11-18 | End: 2020-11-18

## 2020-11-18 RX ORDER — DIAZEPAM 2 MG/1
4 TABLET ORAL DAILY PRN
Status: DISCONTINUED | OUTPATIENT
Start: 2020-11-18 | End: 2020-11-19

## 2020-11-18 RX ORDER — ASPIRIN 81 MG/1
81 TABLET ORAL DAILY
Status: DISCONTINUED | OUTPATIENT
Start: 2020-11-19 | End: 2020-11-19

## 2020-11-18 RX ORDER — DIAZEPAM 5 MG/1
5 TABLET ORAL DAILY PRN
Status: DISCONTINUED | OUTPATIENT
Start: 2020-11-18 | End: 2020-11-19

## 2020-11-18 RX ORDER — LIDOCAINE HYDROCHLORIDE 10 MG/ML
INJECTION, SOLUTION EPIDURAL; INFILTRATION; INTRACAUDAL; PERINEURAL
Status: COMPLETED
Start: 2020-11-18 | End: 2020-11-18

## 2020-11-18 RX ORDER — ACETAMINOPHEN 325 MG/1
TABLET ORAL
Status: COMPLETED
Start: 2020-11-18 | End: 2020-11-18

## 2020-11-18 RX ORDER — MIDAZOLAM HYDROCHLORIDE 1 MG/ML
INJECTION INTRAMUSCULAR; INTRAVENOUS
Status: COMPLETED
Start: 2020-11-18 | End: 2020-11-18

## 2020-11-18 RX ORDER — NITROGLYCERIN 20 MG/100ML
INJECTION INTRAVENOUS
Status: COMPLETED
Start: 2020-11-18 | End: 2020-11-18

## 2020-11-18 RX ORDER — PANTOPRAZOLE SODIUM 40 MG/1
40 TABLET, DELAYED RELEASE ORAL
Status: DISCONTINUED | OUTPATIENT
Start: 2020-11-18 | End: 2020-11-19

## 2020-11-18 RX ORDER — SODIUM CHLORIDE 9 MG/ML
INJECTION, SOLUTION INTRAVENOUS
Status: DISCONTINUED | OUTPATIENT
Start: 2020-11-19 | End: 2020-11-18 | Stop reason: HOSPADM

## 2020-11-18 RX ADMIN — MELATONIN 3 MG: at 21:01:00

## 2020-11-18 RX ADMIN — ACETAMINOPHEN 650 MG: 325 TABLET ORAL at 21:01:00

## 2020-11-18 NOTE — H&P
History & Physical Examination    Patient Name: Antione Bledsoe  MRN: [id number]  CSN: [id number]  YOB: 1945    Diagnosis: CAD of proximal right    Present Illness:  This is a 70year old female with hx of abnormal UFCT, chest pain, dyslipide (PROBIOTIC OR), Take  by mouth., Disp: , Rfl: , 11/17/2020 at Unknown time    •  Azelastine HCl 0.1 % Nasal Solution, 2 sprays by Nasal route 2 (two) times daily.  (Patient taking differently: 2 sprays by Nasal route 2 (two) times daily as needed.  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 Any changes noted above.     Theodora Roles Zach  11/18/2020  12:09 PM

## 2020-11-18 NOTE — PROCEDURES
[address]    PATIENT'S NAME: Audithai Story   ATTENDING PHYSICIAN: Stefan Parson M.D. OPERATING PHYSICIAN: Stefan Parson M.D.    PATIENT ACCOUNT#:   [de-identified]    LOCATION:  [facility] 3 ED  MEDICAL RECORD #:   [id number]       MARY ANNE

## 2020-11-18 NOTE — IVS NOTE
Pt s/p cardiac stent with Dr. Laurel Skinner. Pt recovering and staying overnight in holding. Right groin with angioseal. Upon arrival site was no bleeding or hematoma. +pedals.  About an hour after recovery pt right groin dressing found to have a small amount of

## 2020-11-19 ENCOUNTER — TELEPHONE (OUTPATIENT)
Dept: CARDIOLOGY | Age: 75
End: 2020-11-19

## 2020-11-19 VITALS
DIASTOLIC BLOOD PRESSURE: 82 MMHG | SYSTOLIC BLOOD PRESSURE: 147 MMHG | WEIGHT: 118 LBS | OXYGEN SATURATION: 98 % | RESPIRATION RATE: 13 BRPM | TEMPERATURE: 98 F | HEIGHT: 62 IN | HEART RATE: 54 BPM | BODY MASS INDEX: 21.71 KG/M2

## 2020-11-19 PROCEDURE — 99217 OBSERVATION CARE DISCHARGE: CPT | Performed by: CLINICAL NURSE SPECIALIST

## 2020-11-19 RX ORDER — CLOPIDOGREL BISULFATE 75 MG/1
TABLET ORAL
Status: COMPLETED
Start: 2020-11-19 | End: 2020-11-19

## 2020-11-19 NOTE — PROGRESS NOTES
[facility] Cardiology Progress Note    Subjective:  Feels \"jittery\"this am.  Has not slept since midnight. R groin site a little sore, up and around in room. Has hx of GERD, wants to take her asa at home.       Objective:  /81   Pulse 59   Temp 98.2

## 2020-11-19 NOTE — PROGRESS NOTES
Rc'd pt from RN s/p PCI to LAD.  at bedside. VSS. Angioseal to right groin is soft, clean and dry. Tender. No bleeding or hematoma. Pt ambulated to bathroom and tolerated well. Voided. Groin stable. Pt denies c/o chest pain or SOB.  Pt c/o chronic ne

## 2020-11-19 NOTE — CARDIAC REHAB
Cardiac Rehab Stent teaching initiated and complete. We will call patient when CR reopens after 1/2021.

## 2020-11-20 ENCOUNTER — TELEPHONE (OUTPATIENT)
Dept: CARDIOLOGY | Age: 75
End: 2020-11-20

## 2020-11-24 ENCOUNTER — TELEPHONE (OUTPATIENT)
Dept: CARDIOLOGY | Age: 75
End: 2020-11-24

## 2020-12-01 ENCOUNTER — OFFICE VISIT (OUTPATIENT)
Dept: CARDIOLOGY | Age: 75
End: 2020-12-01

## 2020-12-01 VITALS
BODY MASS INDEX: 22.08 KG/M2 | WEIGHT: 120 LBS | HEIGHT: 62 IN | HEART RATE: 64 BPM | SYSTOLIC BLOOD PRESSURE: 150 MMHG | DIASTOLIC BLOOD PRESSURE: 70 MMHG

## 2020-12-01 DIAGNOSIS — E78.00 PURE HYPERCHOLESTEROLEMIA: ICD-10-CM

## 2020-12-01 DIAGNOSIS — K29.50 OTHER CHRONIC GASTRITIS WITHOUT HEMORRHAGE: ICD-10-CM

## 2020-12-01 DIAGNOSIS — M79.7 FIBROMYALGIA: ICD-10-CM

## 2020-12-01 DIAGNOSIS — I25.10 CORONARY ARTERY DISEASE INVOLVING NATIVE CORONARY ARTERY OF NATIVE HEART WITHOUT ANGINA PECTORIS: Primary | ICD-10-CM

## 2020-12-01 DIAGNOSIS — R93.1 ELEVATED CORONARY ARTERY CALCIUM SCORE: ICD-10-CM

## 2020-12-01 DIAGNOSIS — Z95.5 S/P RIGHT CORONARY ARTERY (RCA) STENT PLACEMENT: ICD-10-CM

## 2020-12-01 DIAGNOSIS — G25.0 ESSENTIAL TREMOR: ICD-10-CM

## 2020-12-01 PROCEDURE — 99215 OFFICE O/P EST HI 40 MIN: CPT | Performed by: INTERNAL MEDICINE

## 2020-12-01 RX ORDER — LOSARTAN POTASSIUM 25 MG/1
25 TABLET ORAL DAILY
Qty: 90 TABLET | Refills: 3 | Status: SHIPPED | OUTPATIENT
Start: 2020-12-01

## 2020-12-01 RX ORDER — LOSARTAN POTASSIUM 25 MG/1
25 TABLET ORAL DAILY
COMMUNITY
End: 2020-12-01 | Stop reason: SDUPTHER

## 2020-12-01 ASSESSMENT — ENCOUNTER SYMPTOMS
SUSPICIOUS LESIONS: 0
CONSTIPATION: 1
SHORTNESS OF BREATH: 1
CHILLS: 0
WEIGHT GAIN: 0
ALLERGIC/IMMUNOLOGIC COMMENTS: NO NEW FOOD ALLERGIES
BRUISES/BLEEDS EASILY: 0
FEVER: 0
HEMOPTYSIS: 0
HEMATOCHEZIA: 0
COUGH: 0
WEIGHT LOSS: 0

## 2020-12-01 ASSESSMENT — PATIENT HEALTH QUESTIONNAIRE - PHQ9
1. LITTLE INTEREST OR PLEASURE IN DOING THINGS: NOT AT ALL
2. FEELING DOWN, DEPRESSED OR HOPELESS: NOT AT ALL
CLINICAL INTERPRETATION OF PHQ9 SCORE: NO FURTHER SCREENING NEEDED
SUM OF ALL RESPONSES TO PHQ9 QUESTIONS 1 AND 2: 0
CLINICAL INTERPRETATION OF PHQ2 SCORE: NO FURTHER SCREENING NEEDED
SUM OF ALL RESPONSES TO PHQ9 QUESTIONS 1 AND 2: 0

## 2020-12-02 ENCOUNTER — TELEPHONE (OUTPATIENT)
Dept: CARDIOLOGY | Age: 75
End: 2020-12-02

## 2020-12-02 DIAGNOSIS — R00.2 PALPITATIONS: ICD-10-CM

## 2020-12-02 DIAGNOSIS — Z95.5 S/P RIGHT CORONARY ARTERY (RCA) STENT PLACEMENT: ICD-10-CM

## 2020-12-02 DIAGNOSIS — I25.10 CORONARY ARTERY DISEASE INVOLVING NATIVE CORONARY ARTERY OF NATIVE HEART WITHOUT ANGINA PECTORIS: Primary | ICD-10-CM

## 2020-12-02 DIAGNOSIS — R93.1 ELEVATED CORONARY ARTERY CALCIUM SCORE: ICD-10-CM

## 2020-12-10 ENCOUNTER — TELEPHONE (OUTPATIENT)
Dept: CARDIOLOGY | Age: 75
End: 2020-12-10

## 2020-12-23 ENCOUNTER — TELEPHONE (OUTPATIENT)
Dept: CARDIOLOGY | Age: 75
End: 2020-12-23

## 2020-12-28 ENCOUNTER — TELEPHONE (OUTPATIENT)
Dept: CARDIOLOGY | Age: 75
End: 2020-12-28

## 2020-12-30 ENCOUNTER — APPOINTMENT (OUTPATIENT)
Dept: CARDIOLOGY | Age: 75
End: 2020-12-30

## 2021-01-01 ENCOUNTER — EXTERNAL RECORD (OUTPATIENT)
Dept: HEALTH INFORMATION MANAGEMENT | Facility: OTHER | Age: 76
End: 2021-01-01

## 2021-01-06 ENCOUNTER — OFFICE VISIT (OUTPATIENT)
Dept: CARDIOLOGY | Age: 76
End: 2021-01-06

## 2021-01-06 VITALS
HEART RATE: 80 BPM | HEIGHT: 62 IN | SYSTOLIC BLOOD PRESSURE: 122 MMHG | BODY MASS INDEX: 22 KG/M2 | WEIGHT: 119.56 LBS | DIASTOLIC BLOOD PRESSURE: 68 MMHG

## 2021-01-06 DIAGNOSIS — M79.7 FIBROMYALGIA: ICD-10-CM

## 2021-01-06 DIAGNOSIS — E78.00 PURE HYPERCHOLESTEROLEMIA: ICD-10-CM

## 2021-01-06 DIAGNOSIS — G25.0 ESSENTIAL TREMOR: ICD-10-CM

## 2021-01-06 DIAGNOSIS — I25.10 CORONARY ARTERY DISEASE INVOLVING NATIVE CORONARY ARTERY OF NATIVE HEART WITHOUT ANGINA PECTORIS: ICD-10-CM

## 2021-01-06 DIAGNOSIS — Z95.5 S/P RIGHT CORONARY ARTERY (RCA) STENT PLACEMENT: ICD-10-CM

## 2021-01-06 DIAGNOSIS — F43.10 POST TRAUMATIC STRESS DISORDER (PTSD): Primary | ICD-10-CM

## 2021-01-06 PROCEDURE — 99215 OFFICE O/P EST HI 40 MIN: CPT | Performed by: NURSE PRACTITIONER

## 2021-01-06 SDOH — HEALTH STABILITY: MENTAL HEALTH: HOW MANY STANDARD DRINKS CONTAINING ALCOHOL DO YOU HAVE ON A TYPICAL DAY?: 1 OR 2

## 2021-01-06 SDOH — HEALTH STABILITY: MENTAL HEALTH: HOW OFTEN DO YOU HAVE A DRINK CONTAINING ALCOHOL?: 4 OR MORE TIMES A WEEK

## 2021-01-06 ASSESSMENT — ENCOUNTER SYMPTOMS
ROS GI COMMENTS: + ACID REFLUX
ABDOMINAL PAIN: 0
ORTHOPNEA: 0
NEAR-SYNCOPE: 0
SYNCOPE: 0
NIGHT SWEATS: 0
FEVER: 0
SHORTNESS OF BREATH: 0
BLOATING: 0
COUGH: 0

## 2021-01-06 ASSESSMENT — PATIENT HEALTH QUESTIONNAIRE - PHQ9
SUM OF ALL RESPONSES TO PHQ9 QUESTIONS 1 AND 2: 0
2. FEELING DOWN, DEPRESSED OR HOPELESS: NOT AT ALL
1. LITTLE INTEREST OR PLEASURE IN DOING THINGS: NOT AT ALL
CLINICAL INTERPRETATION OF PHQ2 SCORE: NO FURTHER SCREENING NEEDED
SUM OF ALL RESPONSES TO PHQ9 QUESTIONS 1 AND 2: 0
CLINICAL INTERPRETATION OF PHQ9 SCORE: NO FURTHER SCREENING NEEDED

## 2021-01-07 ENCOUNTER — TELEPHONE (OUTPATIENT)
Dept: CARDIOLOGY | Age: 76
End: 2021-01-07

## 2021-01-07 DIAGNOSIS — I25.10 CORONARY ARTERY DISEASE INVOLVING NATIVE CORONARY ARTERY OF NATIVE HEART WITHOUT ANGINA PECTORIS: ICD-10-CM

## 2021-01-07 DIAGNOSIS — R07.9 CHEST PAIN, UNSPECIFIED TYPE: Primary | ICD-10-CM

## 2021-01-08 ENCOUNTER — TELEPHONE (OUTPATIENT)
Dept: CARDIOLOGY | Age: 76
End: 2021-01-08

## 2021-01-15 ENCOUNTER — HOSPITAL ENCOUNTER (OUTPATIENT)
Dept: INTERVENTIONAL RADIOLOGY/VASCULAR | Facility: HOSPITAL | Age: 76
Discharge: HOME | End: 2021-01-15
Attending: INTERNAL MEDICINE
Payer: MEDICARE

## 2021-01-15 DIAGNOSIS — R07.9 CHEST PAIN: Primary | ICD-10-CM

## 2021-01-18 PROBLEM — G57.00 PIRIFORMIS SYNDROME, UNSPECIFIED LATERALITY: Status: ACTIVE | Noted: 2021-01-18

## 2021-02-01 ENCOUNTER — OFFICE VISIT (OUTPATIENT)
Dept: CARDIOLOGY | Age: 76
End: 2021-02-01

## 2021-02-01 VITALS
DIASTOLIC BLOOD PRESSURE: 70 MMHG | SYSTOLIC BLOOD PRESSURE: 130 MMHG | HEART RATE: 68 BPM | BODY MASS INDEX: 22.26 KG/M2 | HEIGHT: 62 IN | WEIGHT: 121 LBS

## 2021-02-01 DIAGNOSIS — E78.00 PURE HYPERCHOLESTEROLEMIA: ICD-10-CM

## 2021-02-01 DIAGNOSIS — R93.1 ELEVATED CORONARY ARTERY CALCIUM SCORE: Primary | ICD-10-CM

## 2021-02-01 DIAGNOSIS — G25.0 ESSENTIAL TREMOR: ICD-10-CM

## 2021-02-01 DIAGNOSIS — R00.2 PALPITATIONS: ICD-10-CM

## 2021-02-01 DIAGNOSIS — Z95.5 S/P RIGHT CORONARY ARTERY (RCA) STENT PLACEMENT: ICD-10-CM

## 2021-02-01 PROCEDURE — 99213 OFFICE O/P EST LOW 20 MIN: CPT | Performed by: INTERNAL MEDICINE

## 2021-02-01 RX ORDER — PRASUGREL 10 MG/1
10 TABLET, FILM COATED ORAL DAILY
Qty: 90 TABLET | Refills: 3 | Status: SHIPPED | OUTPATIENT
Start: 2021-02-01 | End: 2021-02-12 | Stop reason: DRUGHIGH

## 2021-02-01 ASSESSMENT — PATIENT HEALTH QUESTIONNAIRE - PHQ9
1. LITTLE INTEREST OR PLEASURE IN DOING THINGS: NOT AT ALL
CLINICAL INTERPRETATION OF PHQ2 SCORE: NO FURTHER SCREENING NEEDED
SUM OF ALL RESPONSES TO PHQ9 QUESTIONS 1 AND 2: 0
CLINICAL INTERPRETATION OF PHQ9 SCORE: NO FURTHER SCREENING NEEDED
CLINICAL INTERPRETATION OF PHQ2 SCORE: NO FURTHER SCREENING NEEDED
1. LITTLE INTEREST OR PLEASURE IN DOING THINGS: NOT AT ALL
2. FEELING DOWN, DEPRESSED OR HOPELESS: NOT AT ALL
2. FEELING DOWN, DEPRESSED OR HOPELESS: NOT AT ALL
SUM OF ALL RESPONSES TO PHQ9 QUESTIONS 1 AND 2: 0
SUM OF ALL RESPONSES TO PHQ9 QUESTIONS 1 AND 2: 0

## 2021-02-01 ASSESSMENT — ENCOUNTER SYMPTOMS
FEVER: 0
ALLERGIC/IMMUNOLOGIC COMMENTS: NO NEW FOOD ALLERGIES
HEMOPTYSIS: 0
HEMATOCHEZIA: 0
SUSPICIOUS LESIONS: 0
COUGH: 0
CHILLS: 0
WEIGHT LOSS: 0
WEIGHT GAIN: 0
BRUISES/BLEEDS EASILY: 0

## 2021-02-04 ENCOUNTER — APPOINTMENT (OUTPATIENT)
Dept: CARDIOLOGY | Age: 76
End: 2021-02-04
Attending: NURSE PRACTITIONER

## 2021-02-09 ENCOUNTER — ANCILLARY PROCEDURE (OUTPATIENT)
Dept: CARDIOLOGY | Age: 76
End: 2021-02-09
Attending: NURSE PRACTITIONER

## 2021-02-10 ENCOUNTER — TELEPHONE (OUTPATIENT)
Dept: CARDIOLOGY | Age: 76
End: 2021-02-10

## 2021-02-12 RX ORDER — PRASUGREL 5 MG/1
5 TABLET, FILM COATED ORAL DAILY
Qty: 30 TABLET | Refills: 3 | Status: SHIPPED | OUTPATIENT
Start: 2021-02-12 | End: 2021-06-11

## 2021-02-23 ENCOUNTER — TELEPHONE (OUTPATIENT)
Dept: CARDIOLOGY | Age: 76
End: 2021-02-23

## 2021-03-04 ENCOUNTER — TELEPHONE (OUTPATIENT)
Dept: CARDIOLOGY | Age: 76
End: 2021-03-04

## 2021-03-05 ENCOUNTER — TELEPHONE (OUTPATIENT)
Dept: CARDIOLOGY | Age: 76
End: 2021-03-05

## 2021-03-24 ENCOUNTER — TELEPHONE (OUTPATIENT)
Dept: CARDIOLOGY | Age: 76
End: 2021-03-24

## 2021-03-25 ENCOUNTER — HOSPITAL ENCOUNTER (EMERGENCY)
Facility: HOSPITAL | Age: 76
Discharge: HOME | End: 2021-03-25
Attending: EMERGENCY MEDICINE
Payer: MEDICARE

## 2021-03-25 ENCOUNTER — APPOINTMENT (OUTPATIENT)
Dept: CT IMAGING | Facility: HOSPITAL | Age: 76
End: 2021-03-25
Attending: EMERGENCY MEDICINE
Payer: MEDICARE

## 2021-03-25 VITALS
HEART RATE: 78 BPM | HEIGHT: 62 IN | OXYGEN SATURATION: 95 % | TEMPERATURE: 98 F | RESPIRATION RATE: 18 BRPM | WEIGHT: 117 LBS | SYSTOLIC BLOOD PRESSURE: 148 MMHG | DIASTOLIC BLOOD PRESSURE: 88 MMHG | BODY MASS INDEX: 21.53 KG/M2

## 2021-03-25 DIAGNOSIS — S00.83XA FACIAL HEMATOMA, INITIAL ENCOUNTER: ICD-10-CM

## 2021-03-25 DIAGNOSIS — S09.8XXA BLUNT HEAD TRAUMA, INITIAL ENCOUNTER: Primary | ICD-10-CM

## 2021-03-25 PROCEDURE — 99284 EMERGENCY DEPT VISIT MOD MDM: CPT

## 2021-03-25 PROCEDURE — 70450 CT HEAD/BRAIN W/O DYE: CPT | Performed by: EMERGENCY MEDICINE

## 2021-03-25 NOTE — ED INITIAL ASSESSMENT (HPI)
Pt presented to ED c/o head pain after a book fell from [employer] and the corner of the book hit patient in the head with a small bruise and swelling where the book struck the patient in the forehead.   Pt states she felt fine after the incident but around

## 2021-03-25 NOTE — ED PROVIDER NOTES
Patient Seen in: [facility] Emergency Department      History   Patient presents with:  Head Neck Injury    Stated Complaint: hit by book yesterday on forehead, contusion noted, on blood thinner, sent for *    HPI/Subjective:   HPI    Patient is 78- (postoperative nausea and vomiting)    • Right thyroid nodule 6/25/2012              Past Surgical History:   Procedure Laterality Date   • LAMINECTOMY,THORACIC      w/fusion   • OTHER SURGICAL HISTORY      L forearm surgery   • OTHER SURGICAL HISTORY reactive to light. Oropharynx is clear. Mucous membranes are moist.  There is a contusion noted to the front of the head only with no laceration appreciated. NECK: There is no focal tenderness to palpation appreciated. There is no JVD.  No meningeal signs pain return to the ER if any other problems arise. Patient discharged home at this time.                      Disposition and Plan     Clinical Impression:  Blunt head trauma, initial encounter  (primary encounter diagnosis)  Facial hematoma, initial encou

## 2021-03-29 ENCOUNTER — APPOINTMENT (OUTPATIENT)
Dept: CARDIOLOGY | Age: 76
End: 2021-03-29

## 2021-03-30 PROCEDURE — 93272 ECG/REVIEW INTERPRET ONLY: CPT | Performed by: INTERNAL MEDICINE

## 2021-04-05 ENCOUNTER — OFFICE VISIT (OUTPATIENT)
Dept: CARDIOLOGY | Age: 76
End: 2021-04-05

## 2021-04-05 DIAGNOSIS — E78.00 PURE HYPERCHOLESTEROLEMIA: ICD-10-CM

## 2021-04-05 DIAGNOSIS — I25.10 CORONARY ARTERY DISEASE INVOLVING NATIVE CORONARY ARTERY OF NATIVE HEART WITHOUT ANGINA PECTORIS: Primary | ICD-10-CM

## 2021-04-05 DIAGNOSIS — Z95.5 S/P RIGHT CORONARY ARTERY (RCA) STENT PLACEMENT: ICD-10-CM

## 2021-04-05 PROCEDURE — 99215 OFFICE O/P EST HI 40 MIN: CPT | Performed by: INTERNAL MEDICINE

## 2021-04-05 RX ORDER — LANOLIN ALCOHOL/MO/W.PET/CERES
400 CREAM (GRAM) TOPICAL DAILY
Qty: 90 TABLET | Refills: 3 | Status: SHIPPED | OUTPATIENT
Start: 2021-04-05

## 2021-04-05 ASSESSMENT — PATIENT HEALTH QUESTIONNAIRE - PHQ9
CLINICAL INTERPRETATION OF PHQ2 SCORE: NO FURTHER SCREENING NEEDED
SUM OF ALL RESPONSES TO PHQ9 QUESTIONS 1 AND 2: 0
2. FEELING DOWN, DEPRESSED OR HOPELESS: NOT AT ALL
CLINICAL INTERPRETATION OF PHQ9 SCORE: NO FURTHER SCREENING NEEDED
1. LITTLE INTEREST OR PLEASURE IN DOING THINGS: NOT AT ALL
SUM OF ALL RESPONSES TO PHQ9 QUESTIONS 1 AND 2: 0

## 2021-04-13 PROBLEM — F10.20 UNCOMPLICATED ALCOHOL DEPENDENCE (HCC): Status: ACTIVE | Noted: 2021-04-13

## 2021-04-13 PROBLEM — M25.551 RIGHT HIP PAIN: Status: ACTIVE | Noted: 2021-04-13

## 2021-04-13 PROBLEM — Z95.5 S/P RIGHT CORONARY ARTERY (RCA) STENT PLACEMENT: Status: ACTIVE | Noted: 2020-12-01

## 2021-04-13 PROBLEM — S06.9X0S TRAUMATIC BRAIN INJURY, WITHOUT LOSS OF CONSCIOUSNESS, SEQUELA (HCC): Status: ACTIVE | Noted: 2021-04-13

## 2021-04-13 PROBLEM — S06.9X0S TRAUMATIC BRAIN INJURY, WITHOUT LOSS OF CONSCIOUSNESS, SEQUELA: Status: ACTIVE | Noted: 2021-04-13

## 2021-04-13 PROBLEM — N30.90 CYSTITIS: Status: RESOLVED | Noted: 2020-08-29 | Resolved: 2021-04-13

## 2021-04-20 ENCOUNTER — TELEPHONE (OUTPATIENT)
Dept: CARDIOLOGY | Age: 76
End: 2021-04-20

## 2021-04-21 ENCOUNTER — TELEPHONE (OUTPATIENT)
Dept: CARDIOLOGY | Age: 76
End: 2021-04-21

## 2021-04-30 ENCOUNTER — TELEPHONE (OUTPATIENT)
Dept: CARDIOLOGY | Age: 76
End: 2021-04-30

## 2021-04-30 RX ORDER — ROSUVASTATIN CALCIUM 5 MG/1
5 TABLET, COATED ORAL
Qty: 24 TABLET | Refills: 3 | Status: SHIPPED | OUTPATIENT
Start: 2021-05-03 | End: 2022-04-04

## 2021-06-11 RX ORDER — PRASUGREL 5 MG/1
TABLET, FILM COATED ORAL
Qty: 30 TABLET | Refills: 11 | Status: SHIPPED | OUTPATIENT
Start: 2021-06-11

## 2021-06-22 ENCOUNTER — TELEPHONE (OUTPATIENT)
Dept: CARDIOLOGY | Age: 76
End: 2021-06-22

## 2021-07-16 ENCOUNTER — HOSPITAL ENCOUNTER (OUTPATIENT)
Dept: LAB | Facility: HOSPITAL | Age: 76
Discharge: HOME | End: 2021-07-16
Attending: INTERNAL MEDICINE
Payer: MEDICARE

## 2021-07-16 LAB
ALBUMIN SERPL-MCNC: 4.1 G/DL (ref 3.4–5)
ALBUMIN/GLOB SERPL: 1.1 {RATIO} (ref 1–2)
ALP LIVER SERPL-CCNC: 65 U/L
ALT SERPL-CCNC: 32 U/L
ANION GAP SERPL CALC-SCNC: 3 MMOL/L (ref 0–18)
AST SERPL-CCNC: 22 U/L (ref 15–37)
BASOPHILS # BLD AUTO: 0.04 X10(3) UL (ref 0–0.2)
BASOPHILS NFR BLD AUTO: 0.8 %
BILIRUB SERPL-MCNC: 0.5 MG/DL (ref 0.1–2)
BUN BLD-MCNC: 21 MG/DL (ref 7–18)
BUN/CREAT SERPL: 24.4 (ref 10–20)
CALCIUM BLD-MCNC: 8.9 MG/DL (ref 8.5–10.1)
CHLORIDE SERPL-SCNC: 99 MMOL/L (ref 98–112)
CO2 SERPL-SCNC: 28 MMOL/L (ref 21–32)
CREAT BLD-MCNC: 0.86 MG/DL
DEPRECATED RDW RBC AUTO: 45.1 FL (ref 35.1–46.3)
EOSINOPHIL # BLD AUTO: 0.1 X10(3) UL (ref 0–0.7)
EOSINOPHIL NFR BLD AUTO: 2 %
ERYTHROCYTE [DISTWIDTH] IN BLOOD BY AUTOMATED COUNT: 13.2 % (ref 11–15)
GLOBULIN PLAS-MCNC: 3.7 G/DL (ref 2.8–4.4)
GLUCOSE BLD-MCNC: 91 MG/DL (ref 70–99)
HCT VFR BLD AUTO: 39.5 %
HGB BLD-MCNC: 13.2 G/DL
IMM GRANULOCYTES # BLD AUTO: 0 X10(3) UL (ref 0–1)
IMM GRANULOCYTES NFR BLD: 0 %
LYMPHOCYTES # BLD AUTO: 1.76 X10(3) UL (ref 1–4)
LYMPHOCYTES NFR BLD AUTO: 34.6 %
M PROTEIN MFR SERPL ELPH: 7.8 G/DL (ref 6.4–8.2)
MCH RBC QN AUTO: 31.5 PG (ref 26–34)
MCHC RBC AUTO-ENTMCNC: 33.4 G/DL (ref 31–37)
MCV RBC AUTO: 94.3 FL
MONOCYTES # BLD AUTO: 0.6 X10(3) UL (ref 0.1–1)
MONOCYTES NFR BLD AUTO: 11.8 %
NEUTROPHILS # BLD AUTO: 2.59 X10 (3) UL (ref 1.5–7.7)
NEUTROPHILS # BLD AUTO: 2.59 X10(3) UL (ref 1.5–7.7)
NEUTROPHILS NFR BLD AUTO: 50.8 %
OSMOLALITY SERPL CALC.SUM OF ELEC: 273 MOSM/KG (ref 275–295)
PLATELET # BLD AUTO: 198 10(3)UL (ref 150–450)
POTASSIUM SERPL-SCNC: 4 MMOL/L (ref 3.5–5.1)
RBC # BLD AUTO: 4.19 X10(6)UL
SODIUM SERPL-SCNC: 130 MMOL/L (ref 136–145)
WBC # BLD AUTO: 5.1 X10(3) UL (ref 4–11)

## 2021-07-16 PROCEDURE — 36415 COLL VENOUS BLD VENIPUNCTURE: CPT | Performed by: INTERNAL MEDICINE

## 2021-07-16 PROCEDURE — 85025 COMPLETE CBC W/AUTO DIFF WBC: CPT | Performed by: INTERNAL MEDICINE

## 2021-07-16 PROCEDURE — 80053 COMPREHEN METABOLIC PANEL: CPT | Performed by: INTERNAL MEDICINE

## 2021-07-19 ENCOUNTER — TELEPHONE (OUTPATIENT)
Dept: CARDIOLOGY | Age: 76
End: 2021-07-19

## 2021-07-28 ENCOUNTER — HOSPITAL ENCOUNTER (OUTPATIENT)
Dept: CV DIAGNOSTICS | Facility: HOSPITAL | Age: 76
Discharge: HOME | End: 2021-07-28
Attending: INTERNAL MEDICINE
Payer: MEDICARE

## 2021-07-28 DIAGNOSIS — I25.10 CORONARY ARTERY DISEASE INVOLVING NATIVE CORONARY ARTERY OF NATIVE HEART WITHOUT ANGINA PECTORIS: ICD-10-CM

## 2021-07-28 DIAGNOSIS — E78.00 PURE HYPERCHOLESTEROLEMIA: ICD-10-CM

## 2021-07-28 DIAGNOSIS — Z95.5 S/P RIGHT CORONARY ARTERY (RCA) STENT PLACEMENT: ICD-10-CM

## 2021-07-28 PROCEDURE — 78452 HT MUSCLE IMAGE SPECT MULT: CPT | Performed by: INTERNAL MEDICINE

## 2021-07-28 PROCEDURE — 93017 CV STRESS TEST TRACING ONLY: CPT | Performed by: INTERNAL MEDICINE

## 2021-07-28 PROCEDURE — 93018 CV STRESS TEST I&R ONLY: CPT | Performed by: INTERNAL MEDICINE

## 2021-07-29 ENCOUNTER — TELEPHONE (OUTPATIENT)
Dept: CARDIOLOGY | Age: 76
End: 2021-07-29

## 2021-10-28 ENCOUNTER — HOSPITAL ENCOUNTER (OUTPATIENT)
Dept: LAB | Facility: HOSPITAL | Age: 76
Discharge: HOME | End: 2021-10-28
Attending: NURSE PRACTITIONER
Payer: MEDICARE

## 2021-10-28 DIAGNOSIS — G44.229 CHRONIC TENSION-TYPE HEADACHE, NOT INTRACTABLE: ICD-10-CM

## 2021-10-28 DIAGNOSIS — I67.82 SUBCORTICAL MICROVASCULAR ISCHEMIC OCCLUSIVE DISEASE: ICD-10-CM

## 2021-10-28 DIAGNOSIS — M48.02 CERVICAL STENOSIS OF SPINAL CANAL: ICD-10-CM

## 2021-10-28 DIAGNOSIS — F07.81 POST CONCUSSIVE SYNDROME: ICD-10-CM

## 2021-10-28 DIAGNOSIS — M79.2 NEUROPATHIC PAIN: ICD-10-CM

## 2021-10-28 PROCEDURE — 80053 COMPREHEN METABOLIC PANEL: CPT | Performed by: NURSE PRACTITIONER

## 2021-10-28 PROCEDURE — 84439 ASSAY OF FREE THYROXINE: CPT | Performed by: NURSE PRACTITIONER

## 2021-10-28 PROCEDURE — 84443 ASSAY THYROID STIM HORMONE: CPT

## 2021-10-28 PROCEDURE — 85025 COMPLETE CBC W/AUTO DIFF WBC: CPT

## 2021-10-28 PROCEDURE — 80061 LIPID PANEL: CPT | Performed by: NURSE PRACTITIONER

## 2021-10-28 PROCEDURE — 82607 VITAMIN B-12: CPT

## 2021-10-28 PROCEDURE — 83036 HEMOGLOBIN GLYCOSYLATED A1C: CPT

## 2021-10-28 PROCEDURE — 80053 COMPREHEN METABOLIC PANEL: CPT

## 2021-10-28 PROCEDURE — 82746 ASSAY OF FOLIC ACID SERUM: CPT

## 2021-10-28 PROCEDURE — 36415 COLL VENOUS BLD VENIPUNCTURE: CPT

## 2021-10-28 PROCEDURE — 85025 COMPLETE CBC W/AUTO DIFF WBC: CPT | Performed by: NURSE PRACTITIONER

## 2021-10-29 PROCEDURE — 36415 COLL VENOUS BLD VENIPUNCTURE: CPT | Performed by: NURSE PRACTITIONER

## 2021-10-29 PROCEDURE — 80061 LIPID PANEL: CPT | Performed by: NURSE PRACTITIONER

## 2021-11-01 ENCOUNTER — ORDER TRANSCRIPTION (OUTPATIENT)
Dept: ADMINISTRATIVE | Facility: HOSPITAL | Age: 76
End: 2021-11-01

## 2021-11-01 DIAGNOSIS — Z01.818 PREOP EXAMINATION: Primary | ICD-10-CM

## 2021-11-01 DIAGNOSIS — R06.02 SOB (SHORTNESS OF BREATH): ICD-10-CM

## 2021-11-01 DIAGNOSIS — R00.2 PALPITATIONS: ICD-10-CM

## 2021-11-01 DIAGNOSIS — E78.00 PURE HYPERCHOLESTEROLEMIA: ICD-10-CM

## 2021-11-01 DIAGNOSIS — Z11.59 ENCOUNTER FOR SCREENING FOR OTHER VIRAL DISEASES: ICD-10-CM

## 2021-11-01 DIAGNOSIS — I25.811 CORONARY ARTERY DISEASE INVOLVING NATIVE ARTERY OF TRANSPLANTED HEART WITHOUT ANGINA PECTORIS: Primary | ICD-10-CM

## 2021-11-02 PROBLEM — J34.89 RHINORRHEA: Status: ACTIVE | Noted: 2021-11-02

## 2021-11-02 PROBLEM — R06.00 DYSPNEA, UNSPECIFIED TYPE: Status: ACTIVE | Noted: 2021-11-02

## 2021-11-16 ENCOUNTER — APPOINTMENT (OUTPATIENT)
Dept: LAB | Facility: HOSPITAL | Age: 76
End: 2021-11-16
Attending: INTERNAL MEDICINE
Payer: MEDICARE

## 2021-11-16 ENCOUNTER — HOSPITAL ENCOUNTER (OUTPATIENT)
Dept: CT IMAGING | Facility: HOSPITAL | Age: 76
Discharge: HOME | End: 2021-11-16
Attending: INTERNAL MEDICINE
Payer: MEDICARE

## 2021-11-16 DIAGNOSIS — R93.1 ELEVATED CORONARY ARTERY CALCIUM SCORE: ICD-10-CM

## 2021-11-16 DIAGNOSIS — I25.10 CORONARY ARTERY DISEASE INVOLVING NATIVE CORONARY ARTERY OF NATIVE HEART WITHOUT ANGINA PECTORIS: ICD-10-CM

## 2021-11-16 DIAGNOSIS — E78.00 PURE HYPERCHOLESTEROLEMIA: ICD-10-CM

## 2021-11-16 DIAGNOSIS — R00.2 PALPITATIONS: ICD-10-CM

## 2021-11-16 DIAGNOSIS — R06.02 SOB (SHORTNESS OF BREATH): ICD-10-CM

## 2021-11-16 DIAGNOSIS — Z95.5 S/P RIGHT CORONARY ARTERY (RCA) STENT PLACEMENT: ICD-10-CM

## 2021-11-16 PROCEDURE — 71260 CT THORAX DX C+: CPT | Performed by: INTERNAL MEDICINE

## 2021-12-21 PROBLEM — F10.10 ALCOHOL ABUSE: Status: RESOLVED | Noted: 2018-07-24 | Resolved: 2021-12-21

## 2021-12-21 PROBLEM — F10.20 UNCOMPLICATED ALCOHOL DEPENDENCE (HCC): Chronic | Status: ACTIVE | Noted: 2021-04-13

## 2021-12-21 PROBLEM — F43.10 POST TRAUMATIC STRESS DISORDER (PTSD): Status: RESOLVED | Noted: 2020-06-29 | Resolved: 2021-12-21

## 2021-12-21 PROBLEM — F33.1 MODERATE EPISODE OF RECURRENT MAJOR DEPRESSIVE DISORDER (HCC): Status: ACTIVE | Noted: 2021-12-21

## 2021-12-21 PROBLEM — F41.1 GENERALIZED ANXIETY DISORDER: Chronic | Status: ACTIVE | Noted: 2021-12-21

## 2021-12-21 PROBLEM — F33.1 MODERATE EPISODE OF RECURRENT MAJOR DEPRESSIVE DISORDER (HCC): Chronic | Status: ACTIVE | Noted: 2021-12-21

## 2021-12-21 PROBLEM — F07.81 POST CONCUSSIVE SYNDROME: Status: RESOLVED | Noted: 2019-12-10 | Resolved: 2021-12-21

## 2022-01-13 PROBLEM — J44.9 CHRONIC OBSTRUCTIVE PULMONARY DISEASE, UNSPECIFIED COPD TYPE (HCC): Status: ACTIVE | Noted: 2022-01-13

## 2022-01-18 DIAGNOSIS — Z01.812 PRE-PROCEDURE LAB EXAM: Primary | ICD-10-CM

## 2022-01-25 ENCOUNTER — HOSPITAL ENCOUNTER (OUTPATIENT)
Dept: INTERVENTIONAL RADIOLOGY/VASCULAR | Facility: HOSPITAL | Age: 77
Discharge: HOME | End: 2022-01-25
Attending: INTERNAL MEDICINE
Payer: MEDICARE

## 2022-04-04 NOTE — HISTORICAL OFFICE NOTE
Patient: Katie Alcazar Date: 2022   : 1971 Attending: No att. providers found   50 year old female PCP:  Verify Pcp     Chief Complaint   Patient presents with   • Neurologic Problem     Vestibulitis of ear, right       HISTORY OF PRESENT ILLNESS:  Follow up of patient to Neurology with May 2020 with pain in ears on awakening and 2021 with vertigo episode. Had nystagmus that was not typical of BPPV since changed direction during vestibular therapy testing and felt to be central vertigo. Had MRI of the Brain and note small white matter changes felt to be non specific. No CPA or brainstem or Cerebellar lesion. Last visit was 2021. Duloxetine helped with vestibular migraines at just 20 mg/d. Traveled to [place] and big temperature and humidity changes with some increase in Migraines-also change in sleep schedule. Has some swishing and achiness in ears for a couple days. Hasn't needed Maxalt in recent migraines or Ondansetron for the past year so overall severity of migraines is much better.  New complaint of left buttock pain radiates from low back and occurred with position changes. Onset 2 months ago. Does Hume' exercise and thought it was a pulled muscle but has not gone away.   Duloxetine has helped to reduce intensity of pain in ear. Only one mild migraine since last visit-responded to OTC medication. Has concerns with long term side affects of Duloxetine. Concerned for vision changes or increased blood pressure. Is due for eye exam. Happy with mood stabilizing affect of Duloxetine even with just 20mg/d.  OLD Information:   Has had pain around left ear-can wake patient and be tender to touch with out known injury. May spread to jaw line, no vessicles or electrical sensation. Tender to touch around ear on scalp. No drainage or infection of ear. ENT felt that the patient had vestibulitis in .  Also as HA'a with light sensitivity and no scotoma. 2017 with severe HA in frontal  Progress Notes  - documented in this encounter  Carl Silva MD - 06/30/2020 12:00 PM CDT  Formatting of this note might be different from the original.    [address]    PCP: Jose R Irving MD    Chief Complaint   Patient presents with   • region. Unable to sleep due to severe pain. OTC nn steroidals without help for HA pain. Needed to stay in the dark due to light sensitivity. Treated with Rizatriptan for migraine with benefit. May 2020 with HA associated with pressure sensation in both ears. Denied tinnitus or loss of hearing with HA. Triggers for HAare interrupted sleep if less than 6 hours/night for several nights, hormonal triggers with menstrual cycle, alcohol may be a trigger since happened after she had a beer just before vertigo episode in 4-2021. Has stopped alcohol.  Family history of Migraine HA's in Mother. Maternal Aunt with Hashimoto's thyroiditis. Anxiety in Mother, maternal GM and Mat. Aunts.    REVIEW OF SYSTEMS:  CONSTITUTIONAL:  No unintentional weight change of more than 10 pounds in the last year, no swollen lymph nodes, no vitamin deficiency  EYES:  Astigmatism and myopia-wears glasses. Sensitivity to light with migraine HA's. No blurred vision, lost vision, double vision, eye pain, glaucoma, cataracts, macular degeneration, uveitis, wears glasses, wears contacts, visual floaters, visual halos, visual field loss, other eye problems  EAR, NOSE AND THROAT:  No hearing loss, ringing in the ears, vertigo, swallowing problems, slurred speech, painful ulcers in mouth, nasal or sinus problems including allergies, cough, other ear nose or throat problems  RESPIRATORY:  No snoring, stopping breathing during sleep or diagnosed sleep apnea, asthma, personal history of tuberculosis or exposure to tuberculosis, other respiratory problem  CARDIOVASCULAR:  No hypertension, history of heart disease, high cholesterol, heart palpitations or other heart rhythm problems, heart valve disease, heart murmur, passing out, other heart or vascular problem  GASTROINTESTINAL:  No diarrhea, constipation, bowel urgency, bowel incontinence, red blood in stool or dark tarry stool, mucus in stool, gluten sensitivity, heartburn, history of peptic ulcer disease  • Demerol Other (See Comments)   Injection    • Duloxetine Other (See Comments)   Oral    • Epinephrine Other (See Comments)   Injection    • Lexapro Other (See Comments)   Oral    • Penicillins Other (See Comments)   CLASS      Presenting Medications  Cur Patient with atypical chest pain with elevated ultrafast CAT scan and LDL of 153. She intolerant statins. We may have to consider for Livalo. Had a history of a CTA last year and she met with psychiatrist was coming get up but then did not.  So she has had any known bleeding, history of liver disease, history of colitis or irritable bowel syndrome, other gastrointestinal problem  GENITOURINARY:  No urinary urgency, urinary urge incontinence, urine leakage with coughing laughing or sneezing, difficulty starting urination, inability to empty the bladder completely, urinating more than once per night, frequent bladder infections, history of miscarriages or stillbirths, blood in urine, kidney stones, kidney dysfunction, painful genital ulcers, other kidney bladder or reproductive problem  MUSCULOSKELETAL:  Positive  joint pains. Stiffness of ankles in AM.  No joint swelling, history of osteoarthritis, history of rheumatoid arthritis, history of gout, diagnosed osteoporosis or osteopenia, history of bone fracture, joint replacement, neck pain, back pain, history of spinal stenosis, history of herniated discs, other muscle bone or joint problem  NERVOUS SYSTEM:  No weakness, numbness, tingling, burning or electrical sensation, imbalance, incoordination, slurred speech, worsening of symptoms when overheated, reversing of symptoms when cooling down, headaches, seizures, memory impairment, difficulty multitasking, other brain spinal cord or nerve problems  SKIN:  No rash over cheeks and nose, rash over other parts of the body, skin rash when skin exposed to sunshine, psoriasis, eczema, fingers or toes turn white or blue when exposed to cold, net-like rash over skin, personal or family history of melanoma, personal or family history of non-melanoma skin cancer, other skin problem  ENDOCRINE: No history of diabetes, thyroid disease, Sebastian's disease, goiter, heat or cold intolerance, excessive thirst, other endocrinological problem  HEMATOLOGY:  No anemia, low or high platelet counts, low or high white blood cell counts, history of leukemia or lymphoma, history of blood product transfusion, history of sexual partners receiving blood products or organ transplant, patient or  sexual partners travel to [place], [place], [place] or the [place], other blood problem  PSYCHIATRY:  No history of depression, anxiety, bipolar disorder, suicidal ideation or attempts, claustrophobia with MRI, other psychiatric problem       Recent Labs   Lab 09/15/21  0801   Vitamin D, 25-Hydroxy 24.0*       Past Medical History:   Diagnosis Date   • Herpes    • 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 90 tablet 2   • GLUCOSAMINE-CALCIUM-VIT D PO      • Multiple Vitamins-Minerals (VITAMIN-MINERAL SUPPLEMENT PO) Has vitamin D 1000 IU and iron 15 mg     • DULoxetine (CYMBALTA) 20 MG capsule Take 1 capsule by mouth daily. 30 capsule 11   • acetaminophen (TYLENOL) 500 MG tablet Take 500 mg by mouth every 6 hours as needed for Pain.     • Valacyclovir HCl 1000 MG Tab Take 1,000 mg by mouth daily.     • aspirin-acetaminophen-caffeine (EXCEDRIN MIGRAINE) 250-250-65 MG per tablet Take 1 tablet by mouth every 6 hours as needed for Pain.         ALLERGIES:  No Known Allergies     Family History   Problem Relation Age of Onset   • Osteoporosis Mother    • Hypertension Father    • Heart disease Paternal Uncle    • Thyroid Maternal Aunt        Social History     Tobacco Use   • Smoking status: Never Smoker   • Smokeless tobacco: Never Used   Vaping Use   • Vaping Use: never used   Substance Use Topics   • Alcohol use: Yes     Alcohol/week: 0.0 - 1.0 standard drinks   • Drug use: Never        PHYSICAL EXAM:  Visit Vitals  Ht 4' 11\" (1.499 m)   Wt 57.5 kg (126 lb 12.8 oz)   LMP 09/17/2021   BMI 25.61 kg/m²       GENERAL APPEARANCE:  Patient is neatly dressed, well  groomed and in no apparent distress.    MENTAL STATUS:  Patient is awake, alert and oriented x3.  Speech is fluent and appropriate.  Follows commands well.  Affect is normal and pleasant.    CRANIAL NERVES:   Discs flat and fundi benign.  No temporal pallor of optic discs.  Pupils equal, round and reactive to light and accommodation.  No afferent pupillary defect.  Extraocular muscles are intact without nystagmus or internuclear ophthalmoplegia.  Facial sensation intact in all three trigeminal distributions bilaterally.  Facial strength intact upper and lower bilaterally.  Hears finger rubs bilaterally.  Palate elevates symmetrically.  Tongue protrudes normally without deviation.  Shoulder shrug is strong and symmetrical.    MOTOR:  Strength is 5/5 throughout all 4 extremities.  DTRs (deep tendon reflexes) are 2+ and symmetrical throughout all 4 extremities.  Plantar response is flexor bilaterally. Sharp pain in left lower back to buttocks and lateral side of left leg when up from sitting. Better after walks across room but causes her to limp to keep weight off for a few steps. Will start to ache if standing or walking for longer amout of time. Doesn't recall strain but was travelling and may have irritated with luggage.    SENSORY:  Sharp sensation is intact in all 4 extremities.  Vibration sensation is intact and symmetrical in all 4 distal extremities.  Romberg is negative.    CEREBELLAR:  Finger to nose is intact bilaterally without dysmetria or tremor.  Heel to shin is intact bilaterally without dysmetria.    GAIT:  Normal based and steady.  Able to tandem well.  MIP with eyes closed she turned 30 degrees to left without LOB and was surprised when she opened eyes. Worse when distracted with talking. Has noticed with [place] class she feels a bit off balance with exercises with dipping down and up with feet close together.      IMPRESSION/PLAN:  1. Left lumbar radiculopathy  Low back to left buttock and lateral  leg for past 2 months.  - XR THORACOLUMBAR SPINE 2 VW; Future    2. Vestibulitis of ear, right  4-2021 resolving. Unknown if triggered by virus or autoimmune with thyroiditis-now under control.  Some recurrence of achiness and swishing with travel and recurrent migraines.    3. Vestibular migraine  Has history of migraines with variety of triggers and tries to avoid triggers. Unknown if vertigo or left ear neuralgia associated with migraines or as part of autoimmune response to thyoiditis.   Will monitor with trial of duloxetine and monitoring with migraine log.      The total time I spent on today's clinic visit was 30  minutes which included seeing and examining the patient, reviewing the patient's interim medical records including lab results, imaging reports and actual images, ordering future tests and documenting my note in the medical record. Additional time with family members or other providers was 0 minutes.

## 2022-04-28 ENCOUNTER — HOSPITAL ENCOUNTER (OUTPATIENT)
Dept: INTERVENTIONAL RADIOLOGY/VASCULAR | Facility: HOSPITAL | Age: 77
Discharge: HOME | End: 2022-04-28
Attending: INTERNAL MEDICINE
Payer: MEDICARE

## 2022-05-21 ENCOUNTER — HOSPITAL ENCOUNTER (EMERGENCY)
Facility: HOSPITAL | Age: 77
Discharge: HOME | End: 2022-05-21
Attending: EMERGENCY MEDICINE
Payer: MEDICARE

## 2022-05-21 ENCOUNTER — APPOINTMENT (OUTPATIENT)
Dept: MRI IMAGING | Facility: HOSPITAL | Age: 77
End: 2022-05-21
Attending: EMERGENCY MEDICINE
Payer: MEDICARE

## 2022-05-21 VITALS
OXYGEN SATURATION: 99 % | HEART RATE: 63 BPM | DIASTOLIC BLOOD PRESSURE: 105 MMHG | SYSTOLIC BLOOD PRESSURE: 166 MMHG | WEIGHT: 118 LBS | BODY MASS INDEX: 21.71 KG/M2 | HEIGHT: 62 IN | RESPIRATION RATE: 18 BRPM | TEMPERATURE: 98 F

## 2022-05-21 DIAGNOSIS — U07.1 COVID-19: Primary | ICD-10-CM

## 2022-05-21 DIAGNOSIS — R42 LIGHTHEADEDNESS: ICD-10-CM

## 2022-05-21 LAB
ALBUMIN SERPL-MCNC: 3.3 G/DL (ref 3.4–5)
ALBUMIN/GLOB SERPL: 0.9 {RATIO} (ref 1–2)
ALP LIVER SERPL-CCNC: 57 U/L
ALT SERPL-CCNC: 28 U/L
ANION GAP SERPL CALC-SCNC: 8 MMOL/L (ref 0–18)
AST SERPL-CCNC: 25 U/L (ref 15–37)
BASOPHILS # BLD AUTO: 0.02 X10(3) UL (ref 0–0.2)
BASOPHILS NFR BLD AUTO: 0.6 %
BILIRUB SERPL-MCNC: 0.3 MG/DL (ref 0.1–2)
BUN BLD-MCNC: 14 MG/DL (ref 7–18)
CALCIUM BLD-MCNC: 8.3 MG/DL (ref 8.5–10.1)
CHLORIDE SERPL-SCNC: 103 MMOL/L (ref 98–112)
CO2 SERPL-SCNC: 26 MMOL/L (ref 21–32)
CREAT BLD-MCNC: 0.77 MG/DL
EOSINOPHIL # BLD AUTO: 0.02 X10(3) UL (ref 0–0.7)
EOSINOPHIL NFR BLD AUTO: 0.6 %
ERYTHROCYTE [DISTWIDTH] IN BLOOD BY AUTOMATED COUNT: 13.4 %
GLOBULIN PLAS-MCNC: 3.6 G/DL (ref 2.8–4.4)
GLUCOSE BLD-MCNC: 95 MG/DL (ref 70–99)
HCT VFR BLD AUTO: 36.6 %
HGB BLD-MCNC: 12 G/DL
IMM GRANULOCYTES # BLD AUTO: 0 X10(3) UL (ref 0–1)
IMM GRANULOCYTES NFR BLD: 0 %
LYMPHOCYTES # BLD AUTO: 1.32 X10(3) UL (ref 1–4)
LYMPHOCYTES NFR BLD AUTO: 39.5 %
MCH RBC QN AUTO: 30.9 PG (ref 26–34)
MCHC RBC AUTO-ENTMCNC: 32.8 G/DL (ref 31–37)
MCV RBC AUTO: 94.3 FL
MONOCYTES # BLD AUTO: 0.53 X10(3) UL (ref 0.1–1)
MONOCYTES NFR BLD AUTO: 15.9 %
NEUTROPHILS # BLD AUTO: 1.45 X10 (3) UL (ref 1.5–7.7)
NEUTROPHILS # BLD AUTO: 1.45 X10(3) UL (ref 1.5–7.7)
NEUTROPHILS NFR BLD AUTO: 43.4 %
OSMOLALITY SERPL CALC.SUM OF ELEC: 284 MOSM/KG (ref 275–295)
PLATELET # BLD AUTO: 146 10(3)UL (ref 150–450)
POTASSIUM SERPL-SCNC: 3.9 MMOL/L (ref 3.5–5.1)
PROT SERPL-MCNC: 6.9 G/DL (ref 6.4–8.2)
RBC # BLD AUTO: 3.88 X10(6)UL
SODIUM SERPL-SCNC: 137 MMOL/L (ref 136–145)
TROPONIN I HIGH SENSITIVITY: 6 NG/L
WBC # BLD AUTO: 3.3 X10(3) UL (ref 4–11)

## 2022-05-21 PROCEDURE — 96361 HYDRATE IV INFUSION ADD-ON: CPT

## 2022-05-21 PROCEDURE — 93010 ELECTROCARDIOGRAM REPORT: CPT

## 2022-05-21 PROCEDURE — 80053 COMPREHEN METABOLIC PANEL: CPT | Performed by: EMERGENCY MEDICINE

## 2022-05-21 PROCEDURE — 85025 COMPLETE CBC W/AUTO DIFF WBC: CPT | Performed by: EMERGENCY MEDICINE

## 2022-05-21 PROCEDURE — 70551 MRI BRAIN STEM W/O DYE: CPT | Performed by: EMERGENCY MEDICINE

## 2022-05-21 PROCEDURE — 93005 ELECTROCARDIOGRAM TRACING: CPT

## 2022-05-21 PROCEDURE — 99285 EMERGENCY DEPT VISIT HI MDM: CPT

## 2022-05-21 PROCEDURE — 84484 ASSAY OF TROPONIN QUANT: CPT | Performed by: EMERGENCY MEDICINE

## 2022-05-21 PROCEDURE — 96360 HYDRATION IV INFUSION INIT: CPT

## 2022-05-21 NOTE — ED INITIAL ASSESSMENT (HPI)
PT here from [facility] due to HTN and \"wozzzieness\" that started while there. Pt tested positive for covid this past Wednesday. The chest Xray they did was clear per pt.

## 2022-05-23 LAB
ATRIAL RATE: 65 BPM
P AXIS: 56 DEGREES
P-R INTERVAL: 142 MS
Q-T INTERVAL: 408 MS
QRS DURATION: 78 MS
QTC CALCULATION (BEZET): 424 MS
R AXIS: 63 DEGREES
T AXIS: 37 DEGREES
VENTRICULAR RATE: 65 BPM

## 2022-07-21 ENCOUNTER — HOSPITAL ENCOUNTER (OUTPATIENT)
Dept: LAB | Facility: HOSPITAL | Age: 77
Discharge: HOME | End: 2022-07-21
Attending: INTERNAL MEDICINE
Payer: MEDICARE

## 2022-07-21 LAB
ALT SERPL-CCNC: 26 U/L
AST SERPL-CCNC: 24 U/L (ref 15–37)
CHOLEST SERPL-MCNC: 251 MG/DL (ref ?–200)
FASTING PATIENT LIPID ANSWER: YES
HDLC SERPL-MCNC: 98 MG/DL (ref 40–59)
LDLC SERPL CALC-MCNC: 138 MG/DL (ref ?–100)
NONHDLC SERPL-MCNC: 153 MG/DL (ref ?–130)
TRIGL SERPL-MCNC: 89 MG/DL (ref 30–149)
VLDLC SERPL CALC-MCNC: 16 MG/DL (ref 0–30)

## 2022-07-21 PROCEDURE — 80061 LIPID PANEL: CPT | Performed by: INTERNAL MEDICINE

## 2022-07-21 PROCEDURE — 84460 ALANINE AMINO (ALT) (SGPT): CPT | Performed by: INTERNAL MEDICINE

## 2022-07-21 PROCEDURE — 84450 TRANSFERASE (AST) (SGOT): CPT | Performed by: INTERNAL MEDICINE

## 2022-07-21 PROCEDURE — 36415 COLL VENOUS BLD VENIPUNCTURE: CPT | Performed by: INTERNAL MEDICINE

## 2023-02-28 ENCOUNTER — HOSPITAL ENCOUNTER (OUTPATIENT)
Dept: CT IMAGING | Facility: HOSPITAL | Age: 78
Discharge: HOME | End: 2023-02-28
Attending: INTERNAL MEDICINE
Payer: MEDICARE

## 2023-02-28 DIAGNOSIS — R06.02 SHORTNESS OF BREATH: ICD-10-CM

## 2023-02-28 DIAGNOSIS — I25.10 CAD (CORONARY ARTERY DISEASE): ICD-10-CM

## 2023-02-28 LAB
CREAT BLD-MCNC: 1 MG/DL
GFR SERPLBLD BASED ON 1.73 SQ M-ARVRAT: 58 ML/MIN/1.73M2 (ref 60–?)

## 2023-02-28 PROCEDURE — 82565 ASSAY OF CREATININE: CPT

## 2023-02-28 PROCEDURE — 71260 CT THORAX DX C+: CPT | Performed by: INTERNAL MEDICINE

## 2023-07-20 ENCOUNTER — HOSPITAL ENCOUNTER (OUTPATIENT)
Dept: LAB | Facility: HOSPITAL | Age: 78
Discharge: HOME | End: 2023-07-20
Attending: INTERNAL MEDICINE
Payer: MEDICARE

## 2023-07-20 LAB
ALBUMIN SERPL-MCNC: 3.9 G/DL (ref 3.4–5)
ALBUMIN/GLOB SERPL: 1.1 {RATIO} (ref 1–2)
ALP LIVER SERPL-CCNC: 58 U/L
ALT SERPL-CCNC: 32 U/L
ANION GAP SERPL CALC-SCNC: 5 MMOL/L (ref 0–18)
AST SERPL-CCNC: 27 U/L (ref 15–37)
BILIRUB SERPL-MCNC: 0.6 MG/DL (ref 0.1–2)
BUN BLD-MCNC: 17 MG/DL (ref 7–18)
CALCIUM BLD-MCNC: 9.1 MG/DL (ref 8.5–10.1)
CHLORIDE SERPL-SCNC: 102 MMOL/L (ref 98–112)
CHOLEST SERPL-MCNC: 254 MG/DL (ref ?–200)
CO2 SERPL-SCNC: 27 MMOL/L (ref 21–32)
CREAT BLD-MCNC: 0.97 MG/DL
FASTING PATIENT LIPID ANSWER: YES
FASTING STATUS PATIENT QL REPORTED: YES
GFR SERPLBLD BASED ON 1.73 SQ M-ARVRAT: 61 ML/MIN/1.73M2 (ref 60–?)
GLOBULIN PLAS-MCNC: 3.4 G/DL (ref 2.8–4.4)
GLUCOSE BLD-MCNC: 96 MG/DL (ref 70–99)
HDLC SERPL-MCNC: 100 MG/DL (ref 40–59)
LDLC SERPL CALC-MCNC: 135 MG/DL (ref ?–100)
NONHDLC SERPL-MCNC: 154 MG/DL (ref ?–130)
OSMOLALITY SERPL CALC.SUM OF ELEC: 279 MOSM/KG (ref 275–295)
POTASSIUM SERPL-SCNC: 4.3 MMOL/L (ref 3.5–5.1)
PROT SERPL-MCNC: 7.3 G/DL (ref 6.4–8.2)
SODIUM SERPL-SCNC: 134 MMOL/L (ref 136–145)
TRIGL SERPL-MCNC: 112 MG/DL (ref 30–149)
VLDLC SERPL CALC-MCNC: 20 MG/DL (ref 0–30)

## 2023-07-20 PROCEDURE — 80061 LIPID PANEL: CPT | Performed by: INTERNAL MEDICINE

## 2023-07-20 PROCEDURE — 80053 COMPREHEN METABOLIC PANEL: CPT | Performed by: INTERNAL MEDICINE

## 2023-07-20 PROCEDURE — 36415 COLL VENOUS BLD VENIPUNCTURE: CPT | Performed by: INTERNAL MEDICINE

## 2023-10-18 ENCOUNTER — APPOINTMENT (OUTPATIENT)
Dept: MRI IMAGING | Facility: HOSPITAL | Age: 78
End: 2023-10-18
Attending: EMERGENCY MEDICINE
Payer: MEDICARE

## 2023-10-18 ENCOUNTER — HOSPITAL ENCOUNTER (EMERGENCY)
Facility: HOSPITAL | Age: 78
Discharge: HOME | End: 2023-10-18
Attending: EMERGENCY MEDICINE
Payer: MEDICARE

## 2023-10-18 ENCOUNTER — APPOINTMENT (OUTPATIENT)
Dept: CT IMAGING | Facility: HOSPITAL | Age: 78
End: 2023-10-18
Payer: MEDICARE

## 2023-10-18 VITALS
DIASTOLIC BLOOD PRESSURE: 57 MMHG | SYSTOLIC BLOOD PRESSURE: 174 MMHG | TEMPERATURE: 98 F | WEIGHT: 118 LBS | HEIGHT: 62 IN | OXYGEN SATURATION: 100 % | BODY MASS INDEX: 21.71 KG/M2 | RESPIRATION RATE: 14 BRPM | HEART RATE: 57 BPM

## 2023-10-18 DIAGNOSIS — R26.9 GAIT DISTURBANCE: Primary | ICD-10-CM

## 2023-10-18 LAB
ALBUMIN SERPL-MCNC: 3.7 G/DL (ref 3.4–5)
ALBUMIN/GLOB SERPL: 1 {RATIO} (ref 1–2)
ALP LIVER SERPL-CCNC: 65 U/L
ALT SERPL-CCNC: 31 U/L
ANION GAP SERPL CALC-SCNC: 6 MMOL/L (ref 0–18)
AST SERPL-CCNC: 23 U/L (ref 15–37)
BASOPHILS # BLD AUTO: 0.05 X10(3) UL (ref 0–0.2)
BASOPHILS NFR BLD AUTO: 1.1 %
BILIRUB SERPL-MCNC: 0.5 MG/DL (ref 0.1–2)
BUN BLD-MCNC: 15 MG/DL (ref 7–18)
CALCIUM BLD-MCNC: 8.9 MG/DL (ref 8.5–10.1)
CHLORIDE SERPL-SCNC: 99 MMOL/L (ref 98–112)
CO2 SERPL-SCNC: 26 MMOL/L (ref 21–32)
CREAT BLD-MCNC: 1.04 MG/DL
EGFRCR SERPLBLD CKD-EPI 2021: 56 ML/MIN/1.73M2 (ref 60–?)
EOSINOPHIL # BLD AUTO: 0.06 X10(3) UL (ref 0–0.7)
EOSINOPHIL NFR BLD AUTO: 1.3 %
ERYTHROCYTE [DISTWIDTH] IN BLOOD BY AUTOMATED COUNT: 13.2 %
GLOBULIN PLAS-MCNC: 3.8 G/DL (ref 2.8–4.4)
GLUCOSE BLD-MCNC: 112 MG/DL (ref 70–99)
HCT VFR BLD AUTO: 38.1 %
HGB BLD-MCNC: 13.1 G/DL
IMM GRANULOCYTES # BLD AUTO: 0.01 X10(3) UL (ref 0–1)
IMM GRANULOCYTES NFR BLD: 0.2 %
LYMPHOCYTES # BLD AUTO: 1.27 X10(3) UL (ref 1–4)
LYMPHOCYTES NFR BLD AUTO: 28.2 %
MCH RBC QN AUTO: 31.6 PG (ref 26–34)
MCHC RBC AUTO-ENTMCNC: 34.4 G/DL (ref 31–37)
MCV RBC AUTO: 91.8 FL
MONOCYTES # BLD AUTO: 0.44 X10(3) UL (ref 0.1–1)
MONOCYTES NFR BLD AUTO: 9.8 %
NEUTROPHILS # BLD AUTO: 2.68 X10 (3) UL (ref 1.5–7.7)
NEUTROPHILS # BLD AUTO: 2.68 X10(3) UL (ref 1.5–7.7)
NEUTROPHILS NFR BLD AUTO: 59.4 %
OSMOLALITY SERPL CALC.SUM OF ELEC: 274 MOSM/KG (ref 275–295)
PLATELET # BLD AUTO: 183 10(3)UL (ref 150–450)
POTASSIUM SERPL-SCNC: 4.3 MMOL/L (ref 3.5–5.1)
PROT SERPL-MCNC: 7.5 G/DL (ref 6.4–8.2)
RBC # BLD AUTO: 4.15 X10(6)UL
SODIUM SERPL-SCNC: 131 MMOL/L (ref 136–145)
TROPONIN I HIGH SENSITIVITY: 6 NG/L
WBC # BLD AUTO: 4.5 X10(3) UL (ref 4–11)

## 2023-10-18 PROCEDURE — 36415 COLL VENOUS BLD VENIPUNCTURE: CPT

## 2023-10-18 PROCEDURE — 85025 COMPLETE CBC W/AUTO DIFF WBC: CPT | Performed by: EMERGENCY MEDICINE

## 2023-10-18 PROCEDURE — 93010 ELECTROCARDIOGRAM REPORT: CPT

## 2023-10-18 PROCEDURE — 70553 MRI BRAIN STEM W/O & W/DYE: CPT | Performed by: EMERGENCY MEDICINE

## 2023-10-18 PROCEDURE — 85025 COMPLETE CBC W/AUTO DIFF WBC: CPT

## 2023-10-18 PROCEDURE — 99285 EMERGENCY DEPT VISIT HI MDM: CPT

## 2023-10-18 PROCEDURE — A9575 INJ GADOTERATE MEGLUMI 0.1ML: HCPCS | Performed by: EMERGENCY MEDICINE

## 2023-10-18 PROCEDURE — 80053 COMPREHEN METABOLIC PANEL: CPT | Performed by: EMERGENCY MEDICINE

## 2023-10-18 PROCEDURE — 84484 ASSAY OF TROPONIN QUANT: CPT

## 2023-10-18 PROCEDURE — 93005 ELECTROCARDIOGRAM TRACING: CPT

## 2023-10-18 PROCEDURE — 84484 ASSAY OF TROPONIN QUANT: CPT | Performed by: EMERGENCY MEDICINE

## 2023-10-18 PROCEDURE — 70450 CT HEAD/BRAIN W/O DYE: CPT

## 2023-10-18 PROCEDURE — 80053 COMPREHEN METABOLIC PANEL: CPT

## 2023-10-18 RX ORDER — AMLODIPINE BESYLATE 5 MG/1
TABLET ORAL DAILY
Qty: 30 TABLET | Refills: 0 | Status: SHIPPED | OUTPATIENT
Start: 2023-10-18

## 2023-10-18 RX ORDER — AMLODIPINE BESYLATE 5 MG/1
2.5 TABLET ORAL ONCE
Status: COMPLETED | OUTPATIENT
Start: 2023-10-18 | End: 2023-10-18

## 2023-10-18 RX ORDER — DIPHENHYDRAMINE HYDROCHLORIDE 50 MG/ML
10 INJECTION, SOLUTION INTRAMUSCULAR; INTRAVENOUS
Status: COMPLETED | OUTPATIENT
Start: 2023-10-18 | End: 2023-10-18

## 2023-10-18 RX ORDER — MECLIZINE HYDROCHLORIDE 25 MG/1
25 TABLET ORAL EVERY 8 HOURS PRN
Qty: 20 TABLET | Refills: 0 | Status: SHIPPED | OUTPATIENT
Start: 2023-10-18

## 2023-10-18 RX ORDER — METOPROLOL SUCCINATE 25 MG/1
TABLET, EXTENDED RELEASE ORAL
COMMUNITY
Start: 2022-12-09

## 2023-10-18 NOTE — ED INITIAL ASSESSMENT (HPI)
States yesterday feeling off balance, unsteady, HA. Pt states BP was 188/120 yesterday and she felt cold.  Pt states her pulse yesterday was 46

## 2023-10-18 NOTE — DISCHARGE INSTRUCTIONS
Please make sure to follow-up with neurology. Check your blood pressure first thing in the morning, at night, and once in between. If the blood pressure is elevated in the morning, take 2.5 mg of amlodipine. Wait 1 hour and then recheck the blood pressure. If it is still elevated, take the second 2.5 mg  Follow-up with Dr. Jaxon Abarca office.   Consider taking meclizine if the room starts to spin  Return for any problems

## 2023-10-19 LAB
ATRIAL RATE: 68 BPM
P AXIS: 71 DEGREES
P-R INTERVAL: 144 MS
Q-T INTERVAL: 370 MS
QRS DURATION: 78 MS
QTC CALCULATION (BEZET): 393 MS
R AXIS: 56 DEGREES
T AXIS: 31 DEGREES
VENTRICULAR RATE: 68 BPM

## 2023-11-15 ENCOUNTER — HOSPITAL ENCOUNTER (OUTPATIENT)
Dept: LAB | Facility: HOSPITAL | Age: 78
Discharge: HOME | End: 2023-11-15
Attending: INTERNAL MEDICINE
Payer: MEDICARE

## 2023-11-15 LAB
CHOLEST SERPL-MCNC: 276 MG/DL (ref ?–200)
FASTING PATIENT LIPID ANSWER: YES
HDLC SERPL-MCNC: 98 MG/DL (ref 40–59)
LDLC SERPL CALC-MCNC: 156 MG/DL (ref ?–100)
NONHDLC SERPL-MCNC: 178 MG/DL (ref ?–130)
TRIGL SERPL-MCNC: 130 MG/DL (ref 30–149)
VLDLC SERPL CALC-MCNC: 25 MG/DL (ref 0–30)

## 2023-11-15 PROCEDURE — 80061 LIPID PANEL: CPT | Performed by: INTERNAL MEDICINE

## 2023-11-15 PROCEDURE — 36415 COLL VENOUS BLD VENIPUNCTURE: CPT | Performed by: INTERNAL MEDICINE

## 2024-03-27 ENCOUNTER — LAB ENCOUNTER (OUTPATIENT)
Dept: LAB | Age: 79
End: 2024-03-27
Attending: INTERNAL MEDICINE
Payer: MEDICARE

## 2024-03-27 DIAGNOSIS — E78.00 PURE HYPERCHOLESTEROLEMIA: Primary | ICD-10-CM

## 2024-03-27 LAB
CHOLEST SERPL-MCNC: 266 MG/DL (ref ?–200)
FASTING PATIENT LIPID ANSWER: YES
HDLC SERPL-MCNC: 87 MG/DL (ref 40–59)
LDLC SERPL CALC-MCNC: 156 MG/DL (ref ?–100)
NONHDLC SERPL-MCNC: 179 MG/DL (ref ?–130)
TRIGL SERPL-MCNC: 131 MG/DL (ref 30–149)
VLDLC SERPL CALC-MCNC: 25 MG/DL (ref 0–30)

## 2024-03-27 PROCEDURE — 36415 COLL VENOUS BLD VENIPUNCTURE: CPT

## 2024-03-27 PROCEDURE — 80061 LIPID PANEL: CPT

## 2024-04-26 ENCOUNTER — HOSPITAL ENCOUNTER (OUTPATIENT)
Dept: LAB | Facility: HOSPITAL | Age: 79
Discharge: HOME | End: 2024-04-26
Attending: NURSE PRACTITIONER
Payer: MEDICARE

## 2024-04-26 LAB
T4 SERPL-MCNC: 6.6 UG/DL
TSI SER-ACNC: 1.72 MIU/ML (ref 0.36–3.74)

## 2024-04-26 PROCEDURE — 84443 ASSAY THYROID STIM HORMONE: CPT | Performed by: NURSE PRACTITIONER

## 2024-04-26 PROCEDURE — 36415 COLL VENOUS BLD VENIPUNCTURE: CPT | Performed by: NURSE PRACTITIONER

## 2024-04-26 PROCEDURE — 84436 ASSAY OF TOTAL THYROXINE: CPT | Performed by: NURSE PRACTITIONER

## 2024-08-16 ENCOUNTER — HOSPITAL ENCOUNTER (EMERGENCY)
Facility: HOSPITAL | Age: 79
Discharge: HOME | End: 2024-08-16
Attending: EMERGENCY MEDICINE
Payer: MEDICARE

## 2024-08-16 ENCOUNTER — APPOINTMENT (OUTPATIENT)
Dept: CT IMAGING | Facility: HOSPITAL | Age: 79
End: 2024-08-16
Attending: EMERGENCY MEDICINE
Payer: MEDICARE

## 2024-08-16 VITALS
HEART RATE: 60 BPM | OXYGEN SATURATION: 98 % | RESPIRATION RATE: 15 BRPM | DIASTOLIC BLOOD PRESSURE: 79 MMHG | TEMPERATURE: 98 F | SYSTOLIC BLOOD PRESSURE: 157 MMHG

## 2024-08-16 DIAGNOSIS — E87.1 HYPONATREMIA: Primary | ICD-10-CM

## 2024-08-16 LAB
ALBUMIN SERPL-MCNC: 4.6 G/DL (ref 3.2–4.8)
ALBUMIN/GLOB SERPL: 1.9 {RATIO} (ref 1–2)
ALP LIVER SERPL-CCNC: 77 U/L
ALT SERPL-CCNC: 27 U/L
ANION GAP SERPL CALC-SCNC: 5 MMOL/L (ref 0–18)
AST SERPL-CCNC: 30 U/L (ref ?–34)
BASOPHILS # BLD AUTO: 0.03 X10(3) UL (ref 0–0.2)
BASOPHILS NFR BLD AUTO: 0.7 %
BILIRUB SERPL-MCNC: 0.4 MG/DL (ref 0.2–1.1)
BILIRUB UR QL STRIP.AUTO: NEGATIVE
BUN BLD-MCNC: 18 MG/DL (ref 9–23)
CALCIUM BLD-MCNC: 9.1 MG/DL (ref 8.7–10.4)
CHLORIDE SERPL-SCNC: 96 MMOL/L (ref 98–112)
CLARITY UR REFRACT.AUTO: CLEAR
CO2 SERPL-SCNC: 27 MMOL/L (ref 21–32)
COLOR UR AUTO: COLORLESS
CREAT BLD-MCNC: 0.79 MG/DL
EGFRCR SERPLBLD CKD-EPI 2021: 77 ML/MIN/1.73M2 (ref 60–?)
EOSINOPHIL # BLD AUTO: 0.17 X10(3) UL (ref 0–0.7)
EOSINOPHIL NFR BLD AUTO: 3.8 %
ERYTHROCYTE [DISTWIDTH] IN BLOOD BY AUTOMATED COUNT: 13 %
ETHANOL SERPL-MCNC: <3 MG/DL (ref ?–3)
GLOBULIN PLAS-MCNC: 2.4 G/DL (ref 2–3.5)
GLUCOSE BLD-MCNC: 97 MG/DL (ref 70–99)
GLUCOSE UR STRIP.AUTO-MCNC: NORMAL MG/DL
HCT VFR BLD AUTO: 34.5 %
HGB BLD-MCNC: 12.5 G/DL
IMM GRANULOCYTES # BLD AUTO: 0.01 X10(3) UL (ref 0–1)
IMM GRANULOCYTES NFR BLD: 0.2 %
KETONES UR STRIP.AUTO-MCNC: NEGATIVE MG/DL
LEUKOCYTE ESTERASE UR QL STRIP.AUTO: NEGATIVE
LYMPHOCYTES # BLD AUTO: 1.3 X10(3) UL (ref 1–4)
LYMPHOCYTES NFR BLD AUTO: 29.2 %
MCH RBC QN AUTO: 32.3 PG (ref 26–34)
MCHC RBC AUTO-ENTMCNC: 36.2 G/DL (ref 31–37)
MCV RBC AUTO: 89.1 FL
MONOCYTES # BLD AUTO: 0.47 X10(3) UL (ref 0.1–1)
MONOCYTES NFR BLD AUTO: 10.6 %
NEUTROPHILS # BLD AUTO: 2.47 X10 (3) UL (ref 1.5–7.7)
NEUTROPHILS # BLD AUTO: 2.47 X10(3) UL (ref 1.5–7.7)
NEUTROPHILS NFR BLD AUTO: 55.5 %
NITRITE UR QL STRIP.AUTO: NEGATIVE
OSMOLALITY SERPL CALC.SUM OF ELEC: 268 MOSM/KG (ref 275–295)
PH UR STRIP.AUTO: 5.5 [PH] (ref 5–8)
PLATELET # BLD AUTO: 187 10(3)UL (ref 150–450)
POTASSIUM SERPL-SCNC: 4.4 MMOL/L (ref 3.5–5.1)
PROT SERPL-MCNC: 7 G/DL (ref 5.7–8.2)
PROT UR STRIP.AUTO-MCNC: NEGATIVE MG/DL
RBC # BLD AUTO: 3.87 X10(6)UL
RBC UR QL AUTO: NEGATIVE
SARS-COV-2 RNA RESP QL NAA+PROBE: NOT DETECTED
SODIUM SERPL-SCNC: 128 MMOL/L (ref 136–145)
SP GR UR STRIP.AUTO: 1.01 (ref 1–1.03)
TROPONIN I SERPL HS-MCNC: 5 NG/L
TSI SER-ACNC: 2.13 MIU/ML (ref 0.55–4.78)
UROBILINOGEN UR STRIP.AUTO-MCNC: NORMAL MG/DL
WBC # BLD AUTO: 4.5 X10(3) UL (ref 4–11)

## 2024-08-16 PROCEDURE — 82077 ASSAY SPEC XCP UR&BREATH IA: CPT | Performed by: EMERGENCY MEDICINE

## 2024-08-16 PROCEDURE — 70450 CT HEAD/BRAIN W/O DYE: CPT | Performed by: EMERGENCY MEDICINE

## 2024-08-16 PROCEDURE — 84443 ASSAY THYROID STIM HORMONE: CPT | Performed by: EMERGENCY MEDICINE

## 2024-08-16 PROCEDURE — 85025 COMPLETE CBC W/AUTO DIFF WBC: CPT | Performed by: EMERGENCY MEDICINE

## 2024-08-16 PROCEDURE — 99284 EMERGENCY DEPT VISIT MOD MDM: CPT

## 2024-08-16 PROCEDURE — 80053 COMPREHEN METABOLIC PANEL: CPT | Performed by: EMERGENCY MEDICINE

## 2024-08-16 PROCEDURE — 81003 URINALYSIS AUTO W/O SCOPE: CPT | Performed by: EMERGENCY MEDICINE

## 2024-08-16 PROCEDURE — 96361 HYDRATE IV INFUSION ADD-ON: CPT

## 2024-08-16 PROCEDURE — 96360 HYDRATION IV INFUSION INIT: CPT

## 2024-08-16 PROCEDURE — 80053 COMPREHEN METABOLIC PANEL: CPT

## 2024-08-16 PROCEDURE — 84484 ASSAY OF TROPONIN QUANT: CPT | Performed by: EMERGENCY MEDICINE

## 2024-08-16 PROCEDURE — 85025 COMPLETE CBC W/AUTO DIFF WBC: CPT

## 2024-08-16 RX ORDER — AMLODIPINE BESYLATE 5 MG/1
2.5 TABLET ORAL ONCE
Status: COMPLETED | OUTPATIENT
Start: 2024-08-16 | End: 2024-08-16

## 2024-08-16 NOTE — ED PROVIDER NOTES
Patient Seen in: [facility] Emergency Department      History     Chief Complaint   Patient presents with    Fatigue    Fall     Stated Complaint: weakness to legs feels dehydrated    Subjective:   HPI    79-year-old female presents for evaluation of general weakness and fatigue for many weeks.  Her son just  2 weeks ago and symptoms have been worse.  However they were present even before his death.  Denies any focal weakness or numbness.  Denies headache or visual change.  Occasionally will have some chest pressure.  Patient tripped and fell yesterday and hit the back of her head on floor in her bathroom.  Did not lose consciousness.  Spoke with PCP today who suggested head CT.  PCP also suggested that the patient may be dehydrated as she has not been taking care of herself related to the recent changes in her family.  Patient tells me that she is seeing a psychiatrist, feels depressed, but is not suicidal.  She is a daily drinker but currently going to an addiction medicine specialist.    Objective:   Past Medical History:    Abnormal CT scan, heart    ultrafast heart scan-    Alcoholism (HCC)    Allergic rhinitis    Running Nose    Anesthesia complication    Anxiety    Anxiety state, unspecified    Cervical spondylosis    diffuse    Disorder of thyroid    partila thyroidectomy    Disorders of bursae and tendons in shoulder region, unspecified    Esophageal reflux    Dr Costello    Essential hypertension    Fibromyalgia    DICKSON (generalized anxiety disorder)    Gastritis    chronic    Heart disease    High lipids / arrhythmias    High cholesterol    History of shingles    Lesion of radial nerve    MDD (major depressive disorder)    Personal history of other disorders of nervous system and sense organs    Torsion Dystonia    PONV (postoperative nausea and vomiting)    Right thyroid nodule              Past Surgical History:   Procedure Laterality Date    Laminectomy,thoracic      w/fusion    Other surgical  history      L forearm surgery    Other surgical history      thyroidectomy left, partial on right    Thyroidectomy      Left lobe removed / partial right lobe    Thyroidectomy post prev thyr surg  2001    Upper gi endoscopy - referral  2013    Upper gi endoscopy,biopsy  2019    Dr. Costello - gastritis    Upper gi endoscopy,exam  2014                Social History     Socioeconomic History    Marital status:    Tobacco Use    Smoking status: Former     Current packs/day: 0.00     Average packs/day: 2.0 packs/day for 18.0 years (35.9 ttl pk-yrs)     Types: Cigarettes     Start date: 1/15/1963     Quit date: 1981     Years since quittin.6    Smokeless tobacco: Never   Vaping Use    Vaping status: Never Used   Substance and Sexual Activity    Alcohol use: Yes     Types: 14 Glasses of wine per week     Comment: 3 glasses per night    Drug use: No     Comment: marijuana lotion at HS   Other Topics Concern    Caffeine Concern Yes     Comment: De-caf coffee still causes problems    Stress Concern Yes     Comment: Sometimes    Weight Concern No    Special Diet Yes     Comment: Gluten Diet    Exercise No    Seat Belt No     Social Determinants of Health     Physical Activity: Sufficiently Active (2020)    [facility] [employer] [employer]    Exercise Vital Sign     Days of Exercise per Week: 5 days     Minutes of Exercise per Session: 150+ min    [facility], [facility]    Social Connections    [facility]              Review of Systems    Positive for stated Chief Complaint: Fatigue and Fall    Other systems are as noted in HPI.  Constitutional and vital signs reviewed.      All other systems reviewed and negative except as noted above.    Physical Exam     ED Triage Vitals [24 1246]   /88   Pulse 68   Resp 20   Temp 97.9 °F (36.6 °C)   Temp src Oral   SpO2 96 %   O2  Device None (Room air)       Current Vitals:   Vital Signs  BP: 157/79  Pulse: 60  Resp: 15  Temp: 97.9 °F (36.6 °C)  Temp src: Oral  MAP (mmHg): (!) 102    Oxygen Therapy  SpO2: 98 %  O2 Device: None (Room air)            Physical Exam    General: Alert, oriented, no apparent distress  HEENT:  Pupils equal reactive.  Extraocular motions intact. MMM.  Neck: Supple  Lungs: Clear to auscultation bilaterally.  Heart: Regular rate and rhythm.  Abdomen: Soft, nontender.   Skin: No rash.  No edema.  Neurologic: No focal neurologic deficits.  Normal speech pattern  Musculoskeletal: No tenderness or deformity noted.  Full range of motion throughout.        ED Course     Labs Reviewed   CBC WITH DIFFERENTIAL WITH PLATELET - Abnormal; Notable for the following components:       Result Value    HCT 34.5 (*)     All other components within normal limits   COMP METABOLIC PANEL (14) - Abnormal; Notable for the following components:    Sodium 128 (*)     Chloride 96 (*)     Calculated Osmolality 268 (*)     All other components within normal limits   URINALYSIS WITH CULTURE REFLEX - Abnormal; Notable for the following components:    Urine Color Colorless (*)     All other components within normal limits   ETHYL ALCOHOL - Normal   TSH W REFLEX TO FREE T4 - Normal   TROPONIN I HIGH SENSITIVITY - Normal   SARS-COV-2 BY PCR (GENEXPERT) - Normal                      MDM      Differential diagnosis includes dehydration, metabolic disturbance, UTI, COVID, minor head injury versus subdural hematoma      Sodium 128 which is low.  However baseline is in the low 130s.  Renal function normal.  IV normal saline given    CBC unremarkable    COVID-negative    TSH normal           CT BRAIN OR HEAD (CPT=70450)    Result Date: 8/16/2024  PROCEDURE:  CT BRAIN OR HEAD (05467)  COMPARISON:  [facility] CT, CT BRAIN OR HEAD (38352), 10/18/2023, 11:49 AM.  INDICATIONS:  weakness to legs feels dehydrated  TECHNIQUE:  Noncontrast CT scanning is performed  through the brain. Dose reduction techniques were used. Dose information is transmitted to the ACR (American College of Radiology) NRDR (National Radiology Data Registry) which includes the Dose Index Registry.  PATIENT STATED HISTORY: (As transcribed by Technologist)  bilateral leg weakness.    FINDINGS:  VENTRICLES/SULCI:  Ventricles and sulci are prominent compatible with atrophy. INTRACRANIAL:  There is multifocal and confluent decreased attenuation in periventricular and subcortical white matter consistent with chronic small vessel ischemic change.  There is no specific evidence of acute ischemia, hemorrhage or mass. SINUSES:           No sign of acute sinusitis.  MASTOIDS:          No sign of acute inflammation. SKULL:             No evidence for fracture or osseous abnormality. OTHER:             None.            CONCLUSION:  There is moderate chronic small vessel ischemic change and atrophy noted.  There is no specific evidence of an acute abnormality on the noncontrast CT of the head.    LOCATION:  42   Dictated by (CST): Nilay Moy MD on 8/16/2024 at 5:20 PM     Finalized by (CST): Nilay Moy MD on 8/16/2024 at 5:22 PM        Evaluation and treatment plan discussed with patient and family members present.  Close follow-up is advised for continuing symptoms.  Return to ER recommended if worsening or changing symptoms arise prior to follow-up                Medical Decision Making  Amount and/or Complexity of Data Reviewed  Independent Historian: caregiver     Details:  at the bedside        Disposition and Plan     Clinical Impression:  1. Hyponatremia         Disposition:  Discharge  8/16/2024  5:29 pm    Follow-up:  Kian Beck MD  [address] [facility]  [address] [place] [id number]  [phone]    Call in 3 day(s)            Medications Prescribed:  Current Discharge Medication List

## 2024-08-16 NOTE — ED INITIAL ASSESSMENT (HPI)
Patient has many, many complaints. Patient she's not felt well for the last few weeks. States she feels physically weak. Had labs drawn on  and is concerned about some abnormal lab values she sees. \"I had 8 abnormals!\" Patient states her son just  and is tearful. Patient admits that she doesn't drink water. Thinks she may be dehydrated. Admits that she is a functioning alcoholic, drinks daily. Has been to her 2nd appointment with an addiction specialist. Slipped on the recently mopped floor 2 days ago. Hit posterior head off back wall. No LOC.  States she has abdominal pain. Drinking Gatorade in triage.

## 2024-10-14 ENCOUNTER — HOSPITAL ENCOUNTER (OUTPATIENT)
Dept: LAB | Facility: HOSPITAL | Age: 79
Discharge: HOME | End: 2024-10-14
Attending: NURSE PRACTITIONER
Payer: MEDICARE

## 2024-10-14 DIAGNOSIS — E87.1 HYPONATREMIA: ICD-10-CM

## 2024-10-14 LAB
ALT SERPL-CCNC: 28 U/L
ANION GAP SERPL CALC-SCNC: 6 MMOL/L (ref 0–18)
AST SERPL-CCNC: 32 U/L (ref ?–34)
BUN BLD-MCNC: 17 MG/DL (ref 9–23)
CALCIUM BLD-MCNC: 10.1 MG/DL (ref 8.7–10.4)
CHLORIDE SERPL-SCNC: 98 MMOL/L (ref 98–112)
CHOLEST SERPL-MCNC: 193 MG/DL (ref ?–200)
CO2 SERPL-SCNC: 29 MMOL/L (ref 21–32)
CREAT BLD-MCNC: 1.03 MG/DL
EGFRCR SERPLBLD CKD-EPI 2021: 56 ML/MIN/1.73M2 (ref 60–?)
FASTING PATIENT LIPID ANSWER: YES
GLUCOSE BLD-MCNC: 115 MG/DL (ref 70–99)
HDLC SERPL-MCNC: 90 MG/DL (ref 40–59)
LDLC SERPL CALC-MCNC: 85 MG/DL (ref ?–100)
NONHDLC SERPL-MCNC: 103 MG/DL (ref ?–130)
OSMOLALITY SERPL CALC.SUM OF ELEC: 278 MOSM/KG (ref 275–295)
POTASSIUM SERPL-SCNC: 4.3 MMOL/L (ref 3.5–5.1)
SODIUM SERPL-SCNC: 133 MMOL/L (ref 136–145)
TRIGL SERPL-MCNC: 104 MG/DL (ref 30–149)
VLDLC SERPL CALC-MCNC: 16 MG/DL (ref 0–30)

## 2024-10-14 PROCEDURE — 84450 TRANSFERASE (AST) (SGOT): CPT | Performed by: NURSE PRACTITIONER

## 2024-10-14 PROCEDURE — 80048 BASIC METABOLIC PNL TOTAL CA: CPT | Performed by: EMERGENCY MEDICINE

## 2024-10-14 PROCEDURE — 36415 COLL VENOUS BLD VENIPUNCTURE: CPT | Performed by: NURSE PRACTITIONER

## 2024-10-14 PROCEDURE — 84460 ALANINE AMINO (ALT) (SGPT): CPT | Performed by: NURSE PRACTITIONER

## 2024-10-14 PROCEDURE — 80061 LIPID PANEL: CPT | Performed by: NURSE PRACTITIONER

## 2025-07-21 ENCOUNTER — HOSPITAL ENCOUNTER (EMERGENCY)
Facility: HOSPITAL | Age: 80
Discharge: HOME | End: 2025-07-21
Attending: EMERGENCY MEDICINE
Payer: MEDICARE

## 2025-07-21 VITALS
OXYGEN SATURATION: 99 % | HEART RATE: 57 BPM | DIASTOLIC BLOOD PRESSURE: 111 MMHG | RESPIRATION RATE: 15 BRPM | TEMPERATURE: 98 F | SYSTOLIC BLOOD PRESSURE: 162 MMHG

## 2025-07-21 DIAGNOSIS — R19.7 DIARRHEA, UNSPECIFIED TYPE: Primary | ICD-10-CM

## 2025-07-21 LAB
ALBUMIN SERPL-MCNC: 4.6 G/DL (ref 3.2–4.8)
ALBUMIN/GLOB SERPL: 1.6 (ref 1–2)
ALP LIVER SERPL-CCNC: 52 U/L (ref 55–142)
ALT SERPL-CCNC: 32 U/L (ref 10–49)
ANION GAP SERPL CALC-SCNC: 7 MMOL/L (ref 0–18)
AST SERPL-CCNC: 40 U/L (ref ?–34)
BASOPHILS # BLD AUTO: 0.03 X10(3) UL (ref 0–0.2)
BASOPHILS NFR BLD AUTO: 0.6 %
BILIRUB SERPL-MCNC: 0.5 MG/DL (ref 0.2–1.1)
BUN BLD-MCNC: 12 MG/DL (ref 9–23)
CALCIUM BLD-MCNC: 8.9 MG/DL (ref 8.7–10.6)
CHLORIDE SERPL-SCNC: 96 MMOL/L (ref 98–112)
CO2 SERPL-SCNC: 30 MMOL/L (ref 21–32)
CREAT BLD-MCNC: 1.08 MG/DL (ref 0.55–1.02)
EGFRCR SERPLBLD CKD-EPI 2021: 53 ML/MIN/1.73M2 (ref 60–?)
EOSINOPHIL # BLD AUTO: 0.13 X10(3) UL (ref 0–0.7)
EOSINOPHIL NFR BLD AUTO: 2.7 %
ERYTHROCYTE [DISTWIDTH] IN BLOOD BY AUTOMATED COUNT: 13.4 %
ETHANOL SERPL-MCNC: <3 MG/DL (ref ?–3)
GLOBULIN PLAS-MCNC: 2.9 G/DL (ref 2–3.5)
GLUCOSE BLD-MCNC: 97 MG/DL (ref 70–99)
HCT VFR BLD AUTO: 38.3 % (ref 35–48)
HGB BLD-MCNC: 12.8 G/DL (ref 12–16)
IMM GRANULOCYTES # BLD AUTO: 0.01 X10(3) UL (ref 0–1)
IMM GRANULOCYTES NFR BLD: 0.2 %
LYMPHOCYTES # BLD AUTO: 1.3 X10(3) UL (ref 1–4)
LYMPHOCYTES NFR BLD AUTO: 26.7 %
MAGNESIUM SERPL-MCNC: 2.6 MG/DL (ref 1.6–2.6)
MCH RBC QN AUTO: 31.5 PG (ref 26–34)
MCHC RBC AUTO-ENTMCNC: 33.4 G/DL (ref 31–37)
MCV RBC AUTO: 94.3 FL (ref 80–100)
MONOCYTES # BLD AUTO: 0.54 X10(3) UL (ref 0.1–1)
MONOCYTES NFR BLD AUTO: 11.1 %
NEUTROPHILS # BLD AUTO: 2.85 X10 (3) UL (ref 1.5–7.7)
NEUTROPHILS # BLD AUTO: 2.85 X10(3) UL (ref 1.5–7.7)
NEUTROPHILS NFR BLD AUTO: 58.7 %
OSMOLALITY SERPL CALC.SUM OF ELEC: 276 MOSM/KG (ref 275–295)
PLATELET # BLD AUTO: 187 10(3)UL (ref 150–450)
POTASSIUM SERPL-SCNC: 4.3 MMOL/L (ref 3.5–5.1)
PROT SERPL-MCNC: 7.5 G/DL (ref 5.7–8.2)
RBC # BLD AUTO: 4.06 X10(6)UL (ref 3.8–5.3)
SODIUM SERPL-SCNC: 133 MMOL/L (ref 136–145)
WBC # BLD AUTO: 4.9 X10(3) UL (ref 4–11)

## 2025-07-21 PROCEDURE — 93005 ELECTROCARDIOGRAM TRACING: CPT

## 2025-07-21 PROCEDURE — 82077 ASSAY SPEC XCP UR&BREATH IA: CPT | Performed by: EMERGENCY MEDICINE

## 2025-07-21 PROCEDURE — 85025 COMPLETE CBC W/AUTO DIFF WBC: CPT | Performed by: EMERGENCY MEDICINE

## 2025-07-21 PROCEDURE — 93010 ELECTROCARDIOGRAM REPORT: CPT

## 2025-07-21 PROCEDURE — 99284 EMERGENCY DEPT VISIT MOD MDM: CPT

## 2025-07-21 PROCEDURE — 96360 HYDRATION IV INFUSION INIT: CPT

## 2025-07-21 PROCEDURE — 80053 COMPREHEN METABOLIC PANEL: CPT | Performed by: EMERGENCY MEDICINE

## 2025-07-21 PROCEDURE — 83735 ASSAY OF MAGNESIUM: CPT | Performed by: EMERGENCY MEDICINE

## 2025-07-21 RX ORDER — SODIUM CHLORIDE 9 MG/ML
INJECTION, SOLUTION INTRAVENOUS CONTINUOUS
Status: DISCONTINUED | OUTPATIENT
Start: 2025-07-21 | End: 2025-07-21

## 2025-07-21 NOTE — ED INITIAL ASSESSMENT (HPI)
Diarrhea/constipation over last few days, pcp put on zofran and naltrexone last drink yesterday generalized headache mild nausea/abdominal pain has been taking milk of magnesia

## 2025-07-21 NOTE — ED PROVIDER NOTES
Patient Seen in: [facility] Emergency Department        History  Chief Complaint   Patient presents with    Nausea/Vomiting/Diarrhea    Dehydration     Stated Complaint: Diarrhea, dehydration    Subjective:   HPI            Patient 80-year-old female with vestibular disorder, alcohol abuse, depression PTSD COPD coronary disease among other medical problems presents ED for evaluation today for diarrhea.  Concern for dehydration.  She had diarrhea today.  Mouth has been dry since Friday.  But she is tolerating p.o.  No abdominal pain.  No vomiting.  No other associate symptoms.  No other complaints.      Objective:     No pertinent past medical history.            No pertinent past surgical history.              No pertinent social history.                              Physical Exam    ED Triage Vitals   BP 07/21/25 1030 (!) 171/82   Pulse 07/21/25 1030 61   Resp 07/21/25 1030 12   Temp 07/21/25 1036 98.1 °F (36.7 °C)   Temp src 07/21/25 1036 Oral   SpO2 07/21/25 1030 100 %   O2 Device --        Current Vitals:   Vital Signs  BP: (!) 162/111  Pulse: 57  Resp: 15  Temp: 98.1 °F (36.7 °C)  Temp src: Oral  MAP (mmHg): (!) 127    Oxygen Therapy  SpO2: 99 %            Physical Exam  Vitals and nursing note reviewed.   Constitutional:       General: She is not in acute distress.     Appearance: She is well-developed. She is not toxic-appearing.   HENT:      Head: Normocephalic and atraumatic.      Mouth/Throat:      Pharynx: No oropharyngeal exudate.   Eyes:      General: No scleral icterus.     Conjunctiva/sclera: Conjunctivae normal.      Pupils: Pupils are equal, round, and reactive to light.   Cardiovascular:      Rate and Rhythm: Normal rate and regular rhythm.      Heart sounds: No murmur heard.     No friction rub. No gallop.   Pulmonary:      Effort: Pulmonary effort is normal. No respiratory distress.      Breath sounds: Normal breath sounds. No stridor. No wheezing or rales.   Abdominal:      General: There  is no distension.      Palpations: Abdomen is soft.      Tenderness: There is no abdominal tenderness. There is no guarding or rebound.   Musculoskeletal:         General: No tenderness. Normal range of motion.      Cervical back: Normal range of motion and neck supple.   Skin:     General: Skin is warm and dry.      Findings: No erythema or rash.   Neurological:      Mental Status: She is alert and oriented to person, place, and time.      Cranial Nerves: No cranial nerve deficit.      Motor: No abnormal muscle tone.      Coordination: Coordination normal.   Psychiatric:         Behavior: Behavior normal.                ED Course  Labs Reviewed   COMP METABOLIC PANEL (14) - Abnormal; Notable for the following components:       Result Value    Sodium 133 (*)     Chloride 96 (*)     Creatinine 1.08 (*)     eGFR-Cr 53 (*)     AST 40 (*)     Alkaline Phosphatase 52 (*)     All other components within normal limits   MAGNESIUM - Normal   ETHYL ALCOHOL - Normal   CBC WITH DIFFERENTIAL WITH PLATELET   RAINBOW DRAW LAVENDER   RAINBOW DRAW LIGHT GREEN   RAINBOW DRAW BLUE                             MDM            -History source other than patient          -Comorbidities did add complexity to the management are mentioned in the HPI above        -I personally reviewed the prior external notes and the medical record to obtain additional history I reviewed last ED visit August 2024 seen for hyponatremia        -DDX: Includes but not limited to dehydration electrolyte disturbance which is a medical condition that can pose a threat to life/function    Labs Reviewed   COMP METABOLIC PANEL (14) - Abnormal; Notable for the following components:       Result Value    Sodium 133 (*)     Chloride 96 (*)     Creatinine 1.08 (*)     eGFR-Cr 53 (*)     AST 40 (*)     Alkaline Phosphatase 52 (*)     All other components within normal limits   MAGNESIUM - Normal   ETHYL ALCOHOL - Normal   CBC WITH DIFFERENTIAL WITH PLATELET   RAINBOW DRAW  LAVENDER   RAINBOW DRAW LIGHT GREEN   RAINBOW DRAW BLUE     Labs are reassuring renal function is reassuring sodium is 133.  Magnesium is normal potassium is normal no diarrhea here in the ED.  No abdominal tenderness.  Patient reassured she will be discharged outpatient follow-up.               Medical Decision Making      Disposition and Plan     Clinical Impression:  1. Diarrhea, unspecified type         Disposition:  Discharge  7/21/2025 12:03 pm    Follow-up:  Kian Beck MD  [facility] [place] [address]  [phone]    Follow up            Medications Prescribed:  Current Discharge Medication List                Supplementary Documentation:

## 2025-07-22 LAB
ATRIAL RATE: 61 BPM
P AXIS: 69 DEGREES
P-R INTERVAL: 148 MS
Q-T INTERVAL: 410 MS
QRS DURATION: 78 MS
QTC CALCULATION (BEZET): 412 MS
R AXIS: 50 DEGREES
T AXIS: 15 DEGREES
VENTRICULAR RATE: 61 BPM

## (undated) NOTE — LETTER
[facility] [address], [address]  Consent for Procedure/Sedation    Date:     Time:       1.  I authorize the performance upon Carri Sarkar the following:cardiac catheterization, left ventricular cineangiography, bilat Signature of person authorized                                     Relationship to  to consent for patient: _________________________ patient: ___________________    Witness: _______________________________ Date: _____________________    Printed: 11/6/2020